# Patient Record
Sex: FEMALE | Race: WHITE
[De-identification: names, ages, dates, MRNs, and addresses within clinical notes are randomized per-mention and may not be internally consistent; named-entity substitution may affect disease eponyms.]

---

## 2017-11-14 ENCOUNTER — HOSPITAL ENCOUNTER (INPATIENT)
Dept: HOSPITAL 72 - EMR | Age: 42
LOS: 9 days | Discharge: HOME HEALTH | DRG: 574 | End: 2017-11-23
Payer: COMMERCIAL

## 2017-11-14 VITALS — SYSTOLIC BLOOD PRESSURE: 104 MMHG | DIASTOLIC BLOOD PRESSURE: 58 MMHG

## 2017-11-14 VITALS — DIASTOLIC BLOOD PRESSURE: 82 MMHG | SYSTOLIC BLOOD PRESSURE: 155 MMHG

## 2017-11-14 VITALS — HEIGHT: 69 IN | WEIGHT: 280 LBS | BODY MASS INDEX: 41.47 KG/M2

## 2017-11-14 VITALS — DIASTOLIC BLOOD PRESSURE: 77 MMHG | SYSTOLIC BLOOD PRESSURE: 146 MMHG

## 2017-11-14 DIAGNOSIS — L03.311: Primary | ICD-10-CM

## 2017-11-14 DIAGNOSIS — B96.5: ICD-10-CM

## 2017-11-14 DIAGNOSIS — D62: ICD-10-CM

## 2017-11-14 DIAGNOSIS — K21.9: ICD-10-CM

## 2017-11-14 DIAGNOSIS — R06.02: ICD-10-CM

## 2017-11-14 DIAGNOSIS — M79.7: ICD-10-CM

## 2017-11-14 DIAGNOSIS — T83.511A: ICD-10-CM

## 2017-11-14 DIAGNOSIS — I10: ICD-10-CM

## 2017-11-14 DIAGNOSIS — L73.2: ICD-10-CM

## 2017-11-14 DIAGNOSIS — L03.111: ICD-10-CM

## 2017-11-14 DIAGNOSIS — N39.0: ICD-10-CM

## 2017-11-14 DIAGNOSIS — I47.1: ICD-10-CM

## 2017-11-14 DIAGNOSIS — E66.01: ICD-10-CM

## 2017-11-14 LAB
ALBUMIN/GLOB SERPL: 0.9 {RATIO} (ref 1–2.7)
ALT SERPL-CCNC: 21 U/L (ref 12–78)
ANION GAP SERPL CALC-SCNC: 8 MMOL/L (ref 5–15)
APPEARANCE UR: CLEAR
AST SERPL-CCNC: 12 U/L (ref 15–37)
BACTERIA #/AREA URNS HPF: (no result) /HPF
BASOPHILS NFR BLD AUTO: 0.9 % (ref 0–2)
CALCIUM SERPL-MCNC: 8.8 MG/DL (ref 8.5–10.1)
CHLORIDE SERPL-SCNC: 106 MMOL/L (ref 98–107)
CK MB SERPL-MCNC: 1.7 NG/ML (ref 0–3.6)
CO2 SERPL-SCNC: 26 MMOL/L (ref 21–32)
CREAT SERPL-MCNC: 0.8 MG/DL (ref 0.55–1.3)
EOSINOPHIL NFR BLD AUTO: 2.6 % (ref 0–3)
ERYTHROCYTE [DISTWIDTH] IN BLOOD BY AUTOMATED COUNT: 12.6 % (ref 11.6–14.8)
GFR SERPLBLD BASED ON 1.73 SQ M-ARVRAT: > 60 ML/MIN (ref 60–?)
GLOBULIN SER-MCNC: 4.2 G/DL
KETONES UR QL STRIP: NEGATIVE
LEUKOCYTE ESTERASE UR QL STRIP: NEGATIVE
LYMPHOCYTES NFR BLD AUTO: 21.4 % (ref 20–45)
MCH RBC QN AUTO: 29 PG (ref 27–31)
MCHC RBC AUTO-ENTMCNC: 32.9 G/DL (ref 32–36)
MCV RBC AUTO: 88 FL (ref 80–99)
MONOCYTES NFR BLD AUTO: 5.8 % (ref 1–10)
NEUTROPHILS NFR BLD AUTO: 69.4 % (ref 45–75)
NITRITE UR QL STRIP: NEGATIVE
PH UR STRIP: 6 [PH] (ref 4.5–8)
PLATELET # BLD: 353 K/UL (ref 150–450)
PMV BLD AUTO: 6.1 FL (ref 6.5–10.1)
POTASSIUM SERPL-SCNC: 4 MMOL/L (ref 3.5–5.1)
PROT SERPL-MCNC: 8.1 G/DL (ref 6.4–8.2)
PROT UR QL STRIP: NEGATIVE
RBC # BLD AUTO: 4.53 M/UL (ref 4.2–5.4)
RBC #/AREA URNS HPF: (no result) /HPF (ref 0–2)
SODIUM SERPL-SCNC: 140 MMOL/L (ref 136–145)
SP GR UR STRIP: 1.01 (ref 1–1.03)
SQUAMOUS #/AREA URNS LPF: (no result) /LPF
UROBILINOGEN UR-MCNC: NORMAL MG/DL (ref 0–1)
WBC # BLD AUTO: 8.8 K/UL (ref 4.8–10.8)
WBC #/AREA URNS HPF: (no result) /HPF (ref 0–2)

## 2017-11-14 PROCEDURE — 82553 CREATINE MB FRACTION: CPT

## 2017-11-14 PROCEDURE — 93005 ELECTROCARDIOGRAM TRACING: CPT

## 2017-11-14 PROCEDURE — 80053 COMPREHEN METABOLIC PANEL: CPT

## 2017-11-14 PROCEDURE — 87070 CULTURE OTHR SPECIMN AEROBIC: CPT

## 2017-11-14 PROCEDURE — 94150 VITAL CAPACITY TEST: CPT

## 2017-11-14 PROCEDURE — 94760 N-INVAS EAR/PLS OXIMETRY 1: CPT

## 2017-11-14 PROCEDURE — 71010: CPT

## 2017-11-14 PROCEDURE — 87040 BLOOD CULTURE FOR BACTERIA: CPT

## 2017-11-14 PROCEDURE — 81025 URINE PREGNANCY TEST: CPT

## 2017-11-14 PROCEDURE — 87205 SMEAR GRAM STAIN: CPT

## 2017-11-14 PROCEDURE — 86901 BLOOD TYPING SEROLOGIC RH(D): CPT

## 2017-11-14 PROCEDURE — 99285 EMERGENCY DEPT VISIT HI MDM: CPT

## 2017-11-14 PROCEDURE — 83605 ASSAY OF LACTIC ACID: CPT

## 2017-11-14 PROCEDURE — 94003 VENT MGMT INPAT SUBQ DAY: CPT

## 2017-11-14 PROCEDURE — 93970 EXTREMITY STUDY: CPT

## 2017-11-14 PROCEDURE — 86900 BLOOD TYPING SEROLOGIC ABO: CPT

## 2017-11-14 PROCEDURE — 87181 SC STD AGAR DILUTION PER AGT: CPT

## 2017-11-14 PROCEDURE — 36415 COLL VENOUS BLD VENIPUNCTURE: CPT

## 2017-11-14 PROCEDURE — 85025 COMPLETE CBC W/AUTO DIFF WBC: CPT

## 2017-11-14 PROCEDURE — 81003 URINALYSIS AUTO W/O SCOPE: CPT

## 2017-11-14 PROCEDURE — 93306 TTE W/DOPPLER COMPLETE: CPT

## 2017-11-14 PROCEDURE — 80048 BASIC METABOLIC PNL TOTAL CA: CPT

## 2017-11-14 PROCEDURE — 87086 URINE CULTURE/COLONY COUNT: CPT

## 2017-11-14 PROCEDURE — 86850 RBC ANTIBODY SCREEN: CPT

## 2017-11-14 PROCEDURE — 80076 HEPATIC FUNCTION PANEL: CPT

## 2017-11-14 PROCEDURE — 80202 ASSAY OF VANCOMYCIN: CPT

## 2017-11-14 PROCEDURE — 85007 BL SMEAR W/DIFF WBC COUNT: CPT

## 2017-11-14 PROCEDURE — 83735 ASSAY OF MAGNESIUM: CPT

## 2017-11-14 RX ADMIN — DOCUSATE SODIUM SCH MG: 100 CAPSULE, LIQUID FILLED ORAL at 20:50

## 2017-11-14 RX ADMIN — METOPROLOL TARTRATE SCH MG: 50 TABLET, FILM COATED ORAL at 17:07

## 2017-11-14 RX ADMIN — SODIUM CHLORIDE SCH MLS/HR: 0.9 INJECTION INTRAVENOUS at 18:08

## 2017-11-14 NOTE — HISTORY AND PHYSICAL
History of Present Illness


General


Date patient seen:  Nov 14, 2017


Time patient seen:  14:34


Reason for Hospitalization:  Abscess





Present Illness


HPI


41y/o female with pmh of hidradenitis suppurative, paroxysmal atrial tachycardia

, HTN, GERD who presents with worsening b/l axillary lesions and lower 

abdominal lesions. Pt notes worsening pain/swelling/redness/drainage from b/l 

axillary and lower abd. She has had this for many years. She had prior surgery 

on R axilla abt 1 yr and 10mo ago w/o much success. She has tried antibiotics 

without much effect. She denies f/c, n/v, d/c, chest pain, SOB, abd pain, 

dysuria. She has had general anesthesia before without complications. Able to 

walk a few blocks or climb a flight of stairs w/o symptoms of chest pain, SOB. 

She follows up with a cardiology regarding her Atach and it controlled on 

metoprolol. She has undergone cardiac eval including ECHO which has been 

unremarkable.


Allergies:  


Coded Allergies:  


     CELECOXIB (Verified  Allergy, Unknown, 11/14/17)





Medication History


Scheduled


Cetirizine Hcl* (Zyrtec*), 10 MG ORAL DAILY, (Reported)


Metoprolol Tartrate (Metoprolol Tartrate), 25 MG ORAL DAILY, (Reported)


Metoprolol Tartrate* (Metoprolol Tartrate*), 50 MG ORAL QPM, (Reported)


Omeprazole Magnesium (Prilosec Otc), 20 MG ORAL unknown , (Reported)


Turmeric Root Extract (Turmeric), 500 MG PO TID, (Reported)





Miscellaneous Medications


Ascorbic Acid* (Vitamin C*), 500 MG ORAL, (Reported)


Guaifenesin/Pseudoephedrne Hcl (Mucinex D Er Tablet), 1 EACH PO, (Reported)


Multivitamin/Iron/Folic Acid (Centrum Complete Multivit Tab), 1 EACH PO, (

Reported)


Naproxen Sodium (Aleve), 220 MG PO, (Reported)





Patient History


Healthcare decision maker





Resuscitation status


Full Code


Advanced Directive on File


No





Past Medical/Surgical History


Past Medical/Surgical History:  


(1) Paroxysmal atrial tachycardia


(2) GERD (gastroesophageal reflux disease)


(3) HTN (hypertension)


(4) Hidradenitis suppurativa





Family History


Family History:  


FH: COPD (chronic obstructive pulmonary disease)


FH: HTN (hypertension)


FH: skin cancer





Social History


Social History:  


(1) No significant social history





Review of Systems


ROS Narrative


CONSTITUTIONAL: No weight loss, fever, chills, weakness or fatigue.


HEENT: Eyes: No visual loss, blurred vision, double vision or yellow sclerae. 

Ears, Nose, Throat: No hearing loss, sneezing, congestion, runny nose or sore 

throat.


SKIN: No rash or itching.


CARDIOVASCULAR: No chest pain, chest pressure or chest discomfort. No 

palpitations or edema.


RESPIRATORY: No shortness of breath, cough or sputum.


GASTROINTESTINAL: No anorexia, nausea, vomiting or diarrhea. No abdominal pain 

or blood.


NEUROLOGICAL: No headache, dizziness, syncope, paralysis, ataxia, numbness or 

tingling in the extremities. No change in bowel or bladder control.


MUSCULOSKELETAL: No muscle, back pain, joint pain or stiffness.


HEMATOLOGIC: No anemia, bleeding or bruising.


LYMPHATICS: No enlarged nodes. No history of splenectomy.


PSYCHIATRIC: No history of depression or anxiety.


ENDOCRINOLOGIC: No reports of sweating, cold or heat intolerance. No polyuria 

or polydipsia.


ALLERGIES: No history of asthma, hives, eczema or rhinitis.





Physical Exam


Physical Exam Narrative


General: alert, cooperative, no distress, appears stated age


Head: normocephalic, without obvious abnormality, atraumatic


Eyes: conjunctivae/corneas clear. PERRL, EOM's intact


Throat: lips, mucosa, and tongue normal. MMM


Neck: supple, symmetrical, trachea midline, and no JVD


Lungs: clear to auscultation bilaterally


Heart: regular rate and rhythm, S1, S2 normal, no murmur, click, rub or gallop


Abdomen: soft, non-tender, non-distended, bowel sounds normal; no masses or 

organomegaly


Extremities: extremities normal, atraumatic, no cyanosis or edema


Pulses: 2+ and symmetric


Skin: +HS lesions of b/l axilla and lower abd


Neurologic: grossly normal, no focal deficits





Last 24 Hour Vital Signs








  Date Time  Temp Pulse Resp B/P (MAP) Pulse Ox O2 Delivery O2 Flow Rate FiO2


 


11/14/17 13:43 98.1 78 20 146/77 97 Room Air  


 


11/14/17 11:20 98.1 78 20 146/77 97 Room Air  


 


11/14/17 11:16 98.1 75 20 146/77 97 Room Air  

















Intake and Output  


 


 11/14/17 11/15/17





 19:00 07:00


 


Intake Total 50 ml 


 


Balance 50 ml 


 


  


 


Intake Oral 50 ml 


 


# Voids 1 











Laboratory Tests








Test


  11/14/17


11:40 11/14/17


12:30


 


White Blood Count


  8.8 K/UL


(4.8-10.8) 


 


 


Red Blood Count


  4.53 M/UL


(4.20-5.40) 


 


 


Hemoglobin


  13.1 G/DL


(12.0-16.0) 


 


 


Hematocrit


  39.9 %


(37.0-47.0) 


 


 


Mean Corpuscular Volume 88 FL (80-99)   


 


Mean Corpuscular Hemoglobin


  29.0 PG


(27.0-31.0) 


 


 


Mean Corpuscular Hemoglobin


Concent 32.9 G/DL


(32.0-36.0) 


 


 


Red Cell Distribution Width


  12.6 %


(11.6-14.8) 


 


 


Platelet Count


  353 K/UL


(150-450) 


 


 


Mean Platelet Volume


  6.1 FL


(6.5-10.1)  L 


 


 


Neutrophils (%) (Auto)


  69.4 %


(45.0-75.0) 


 


 


Lymphocytes (%) (Auto)


  21.4 %


(20.0-45.0) 


 


 


Monocytes (%) (Auto)


  5.8 %


(1.0-10.0) 


 


 


Eosinophils (%) (Auto)


  2.6 %


(0.0-3.0) 


 


 


Basophils (%) (Auto)


  0.9 %


(0.0-2.0) 


 


 


Sodium Level


  140 MMOL/L


(136-145) 


 


 


Potassium Level


  4.0 MMOL/L


(3.5-5.1) 


 


 


Chloride Level


  106 MMOL/L


() 


 


 


Carbon Dioxide Level


  26 MMOL/L


(21-32) 


 


 


Anion Gap


  8 mmol/L


(5-15) 


 


 


Blood Urea Nitrogen


  13 mg/dL


(7-18) 


 


 


Creatinine


  0.8 MG/DL


(0.55-1.30) 


 


 


Estimat Glomerular Filtration


Rate > 60 mL/min


(>60) 


 


 


Glucose Level


  104 MG/DL


() 


 


 


Calcium Level


  8.8 MG/DL


(8.5-10.1) 


 


 


Total Bilirubin


  0.2 MG/DL


(0.2-1.0) 


 


 


Aspartate Amino Transf


(AST/SGOT) 12 U/L (15-37)


L 


 


 


Alanine Aminotransferase


(ALT/SGPT) 21 U/L (12-78)


  


 


 


Alkaline Phosphatase


  55 U/L


() 


 


 


Creatine Kinase MB


  1.7 NG/ML


(0.0-3.6) 


 


 


Total Protein


  8.1 G/DL


(6.4-8.2) 


 


 


Albumin


  3.9 G/DL


(3.4-5.0) 


 


 


Globulin 4.2 g/dL   


 


Albumin/Globulin Ratio


  0.9 (1.0-2.7)


L 


 


 


Urine Color  Pale yellow  


 


Urine Appearance  Clear  


 


Urine pH  6 (4.5-8.0)  


 


Urine Specific Gravity


  


  1.010


(1.005-1.035)


 


Urine Protein


  


  Negative


(NEGATIVE)


 


Urine Glucose (UA)


  


  Negative


(NEGATIVE)


 


Urine Ketones


  


  Negative


(NEGATIVE)


 


Urine Occult Blood


  


  4+ (NEGATIVE)


H


 


Urine Nitrite


  


  Negative


(NEGATIVE)


 


Urine Bilirubin


  


  Negative


(NEGATIVE)


 


Urine Urobilinogen


  


  Normal MG/DL


(0.0-1.0)


 


Urine Leukocyte Esterase


  


  Negative


(NEGATIVE)


 


Urine RBC


  


  5-10 /HPF (0 -


2)  H


 


Urine WBC


  


  0-2 /HPF (0 -


2)


 


Urine Squamous Epithelial


Cells 


  Occasional


/LPF


 


Urine Bacteria


  


  Occasional


/HPF (NONE)


 


Lactic Acid Level


  


  1.10 mmol/L


(0.66-2.22)








Height (Feet):  5


Height (Inches):  9.00


Weight (Pounds):  270


Medications





Current Medications








 Medications


  (Trade)  Dose


 Ordered  Sig/Hilda


 Route


 PRN Reason  Start Time


 Stop Time Status Last Admin


Dose Admin


 


 Acetaminophen


  (Tylenol)  650 mg  Q4H  PRN


 ORAL


 Mild Pain (Pain Scale 1-3)  11/14/17 13:30


 12/14/17 13:29   


 


 


 Al Hydroxide/Mg


 Hydroxide


  (Mylanta II)  30 ml  Q6H  PRN


 ORAL


 dyspepsia  11/14/17 13:30


 12/14/17 13:29   


 


 


 Dextrose


  (Dextrose 50%)    STAT  PRN


 IV


 Hypoglycemia  11/14/17 13:30


 12/14/17 13:29   


 


 


 Dextrose/Sodium


 Chloride  1,000 ml @ 


 75 mls/hr  [id number]


 IV


   11/15/17 06:00


 12/15/17 05:59   


 


 


 Diphenhydramine


 HCl


  (Benadryl)  25 mg  Q6H  PRN


 ORAL


 Itching/Pruritis  11/14/17 13:30


 12/14/17 13:29   


 


 


 Docusate Sodium


  (Colace)  100 mg  EVERY 12  HOURS


 ORAL


   11/14/17 21:00


 12/14/17 20:59   


 


 


 Metoprolol


 Tartrate


  (Lopressor)  25 mg  DAILY


 ORAL


   11/15/17 09:00


 12/15/17 08:59 UNV  


 


 


 Metoprolol


 Tartrate


  (Lopressor)  50 mg  QPM


 ORAL


   11/14/17 16:30


 12/14/17 16:29 UNV  


 


 


 Morphine Sulfate


  (Morphine


 Sulfate)  2 mg  Q4H  PRN


 IVP


 Moderate Pain (Pain Scale 4-6)  11/14/17 13:30


 11/21/17 13:29   


 


 


 Morphine Sulfate


  (Morphine


 Sulfate)  4 mg  Q4H  PRN


 IVP


 Severe Pain (Pain Scale 7-10)  11/14/17 13:30


 11/21/17 13:29   


 


 


 Pantoprazole


  (Protonix)  40 mg  DAILY


 ORAL


   11/15/17 09:00


 12/15/17 08:59 UNV  


 


 


 Polyethylene


 Glycol


  (Miralax)  17 gm  HSPRN  PRN


 ORAL


 Constipation  11/14/17 13:30


 12/14/17 13:29   


 


 


 Zolpidem Tartrate


  (Ambien)  5 mg  HSPRN  PRN


 ORAL


 Insomnia  11/14/17 13:30


 11/21/17 13:29   


 











Assessment/Plan


Problem List:  


(1) Hidradenitis suppurativa


ICD Codes:  L73.2 - Hidradenitis suppurativa


SNOMED:  99579681


(2) Abscess


ICD Codes:  L02.91 - Cutaneous abscess, unspecified


SNOMED:  998782234


(3) Paroxysmal atrial tachycardia


ICD Codes:  I47.1 - Supraventricular tachycardia


SNOMED:  749447669


(4) HTN (hypertension)


ICD Codes:  I10 - Essential (primary) hypertension


SNOMED:  94081647


(5) GERD (gastroesophageal reflux disease)


ICD Codes:  K21.9 - Gastro-esophageal reflux disease without esophagitis


SNOMED:  969241871


Status:  stable


Assessment/Plan


Admit inpt


Plastic surgery consulted


Check blood cultures


IV antibiotics


Wound care per surgery


Pain control, bowel regimen


Supportive care


Cont home MTP, PPI





If patient is required to have surgery, based on the patient's medical history, 

and other available ancillary data, the patient is a LOW risk for an 

INTERMEDIATE risk procedure. Per the most recent ACC/AHA guidelines, the 

patient does not need any further cardiopulmonary testing prior to the 

procedure and there do not appear to be any clear medical contraindications to 

proceeding with the proposed procedure.





D/w pt/family, RN, SW/CM, surgery regarding mgmt and dispo











Phil Marie M.D. Nov 14, 2017 14:35

## 2017-11-14 NOTE — EMERGENCY ROOM REPORT
History of Present Illness


General


Chief Complaint:  Skin Rash/Abscess


Source:  Patient





Present Illness


HPI


42-year-old female presents ED for evaluation.  Patient referred by Dr. Bustos.  Patient has extensive hidradenitis in her bilateral axilla and lower 

abdomen.  Has had this for many years.  Patient has had prior surgery without 

improvement.  Denies any fevers or chills.  Denies pain.  States he is noticing 

significant discharge.  Denies any other associated symptoms


Allergies:  


Coded Allergies:  


     CELECOXIB (Verified  Allergy, Unknown, 11/14/17)





Patient History


Past Medical History:  HTN


Past Surgical History:  none


Pertinent Family History:  none


Social History:  Denies: smoking, alcohol use, drug use


Pregnant Now:  No


Immunizations:  UTD


Reviewed Nursing Documentation:  PMH: Agreed, PSxH: Agreed





Nursing Documentation-PMH


Hx Cardiac Problems:  Yes


Hx Hypertension:  Yes


Hx Seizures:  No - FIBROMYAGIA





Review of Systems


All Other Systems:  negative except mentioned in HPI





Physical Exam





Vital Signs








  Date Time  Temp Pulse Resp B/P (MAP) Pulse Ox O2 Delivery O2 Flow Rate FiO2


 


11/14/17 11:16 98.1 75 20 146/77 97 Room Air  








Sp02 EP Interpretation:  reviewed, normal


General Appearance:  no apparent distress, alert, GCS 15, non-toxic


Head:  normocephalic, atraumatic


Eyes:  bilateral eye normal inspection, bilateral eye PERRL


ENT:  hearing grossly normal, normal pharynx, no angioedema, normal voice


Neck:  full range of motion, supple/symm/no masses


Respiratory:  chest non-tender, lungs clear, normal breath sounds, speaking 

full sentences


Cardiovascular #1:  regular rate, rhythm, no edema


Cardiovascular #2:  2+ carotid (R), 2+ carotid (L), 2+ radial (R), 2+ radial (L)

, 2+ dorsalis pedis (R), 2+ dorsalis pedis (L)


Gastrointestinal:  normal bowel sounds, non tender, soft, non-distended, no 

guarding, no rebound


Rectal:  deferred


Genitourinary:  normal inspection, no CVA tenderness


Musculoskeletal:  back normal, gait/station normal, normal range of motion, non-

tender


Neurologic:  alert, oriented x3, responsive, motor strength/tone normal, 

sensory intact, speech normal


Psychiatric:  judgement/insight normal, memory normal, mood/affect normal, no 

suicidal/homicidal ideation


Reflexes:  3+ bicep (R), 3+ bicep (L), 3+ tricep (R), 3+ tricep (L), 3+ knee (R)

, 3+ knee (L)


Skin:  other - hidradenitis in bilateral axilla, lower abdomen


Lymphatic:  no adenopathy





Medical Decision Making


Diagnostic Impression:  


 Primary Impression:  


 Hidradenitis axillaris


ER Course


Hospital Course 


41 yo F presents to ED c/o rash to bilateral axilla, lower abdomen





Differential diagnoses include: Cellulitis, abscess, rash. 





Clinical course


Patient placed on stretcher.  After initial history and physical I ordered labs

, blood Cx, EKG, CXR, wound cx





labs reviewed - no leukocytosis, Hb/Hct stable, electrolytes ok


EKG-normal sinus rhythm no acute changes interpreted by me


Chest x-ray unremarkable





antibiotics given.  Dr. Bustos consulted and aware.  Case discussed with Dr Purdy and he agreed to accept the patient to his service for further care 

and support 





Diagnosis - hidradenitis axillaris





Patient admitted to floor in serious condition





Labs








Test


  11/14/17


11:40 11/14/17


12:30


 


White Blood Count


  8.8 K/UL


(4.8-10.8) 


 


 


Red Blood Count


  4.53 M/UL


(4.20-5.40) 


 


 


Hemoglobin


  13.1 G/DL


(12.0-16.0) 


 


 


Hematocrit


  39.9 %


(37.0-47.0) 


 


 


Mean Corpuscular Volume 88 FL (80-99)  


 


Mean Corpuscular Hemoglobin


  29.0 PG


(27.0-31.0) 


 


 


Mean Corpuscular Hemoglobin


Concent 32.9 G/DL


(32.0-36.0) 


 


 


Red Cell Distribution Width


  12.6 %


(11.6-14.8) 


 


 


Platelet Count


  353 K/UL


(150-450) 


 


 


Mean Platelet Volume


  6.1 FL


(6.5-10.1) 


 


 


Neutrophils (%) (Auto)


  69.4 %


(45.0-75.0) 


 


 


Lymphocytes (%) (Auto)


  21.4 %


(20.0-45.0) 


 


 


Monocytes (%) (Auto)


  5.8 %


(1.0-10.0) 


 


 


Eosinophils (%) (Auto)


  2.6 %


(0.0-3.0) 


 


 


Basophils (%) (Auto)


  0.9 %


(0.0-2.0) 


 


 


Sodium Level


  140 MMOL/L


(136-145) 


 


 


Potassium Level


  4.0 MMOL/L


(3.5-5.1) 


 


 


Chloride Level


  106 MMOL/L


() 


 


 


Carbon Dioxide Level


  26 MMOL/L


(21-32) 


 


 


Anion Gap


  8 mmol/L


(5-15) 


 


 


Blood Urea Nitrogen


  13 mg/dL


(7-18) 


 


 


Creatinine


  0.8 MG/DL


(0.55-1.30) 


 


 


Estimat Glomerular Filtration


Rate > 60 mL/min


(>60) 


 


 


Glucose Level


  104 MG/DL


() 


 


 


Calcium Level


  8.8 MG/DL


(8.5-10.1) 


 


 


Total Bilirubin


  0.2 MG/DL


(0.2-1.0) 


 


 


Aspartate Amino Transf


(AST/SGOT) 12 U/L (15-37) 


  


 


 


Alanine Aminotransferase


(ALT/SGPT) 21 U/L (12-78) 


  


 


 


Alkaline Phosphatase


  55 U/L


() 


 


 


Creatine Kinase MB


  1.7 NG/ML


(0.0-3.6) 


 


 


Total Protein


  8.1 G/DL


(6.4-8.2) 


 


 


Albumin


  3.9 G/DL


(3.4-5.0) 


 


 


Globulin 4.2 g/dL  


 


Albumin/Globulin Ratio 0.9 (1.0-2.7)  


 


Urine Color  Pale yellow 


 


Urine Appearance  Clear 


 


Urine pH  6 (4.5-8.0) 


 


Urine Specific Gravity


  


  1.010


(1.005-1.035)


 


Urine Protein


  


  Negative


(NEGATIVE)


 


Urine Glucose (UA)


  


  Negative


(NEGATIVE)


 


Urine Ketones


  


  Negative


(NEGATIVE)


 


Urine Occult Blood  4+ (NEGATIVE) 


 


Urine Nitrite


  


  Negative


(NEGATIVE)


 


Urine Bilirubin


  


  Negative


(NEGATIVE)


 


Urine Urobilinogen


  


  Normal MG/DL


(0.0-1.0)


 


Urine Leukocyte Esterase


  


  Negative


(NEGATIVE)








EKG Diagnostic Results


Rate:  normal


Rhythm:  NSR


ST Segments:  no acute changes


ASA given to the pt in ED:  No





Rhythm Strip Diag. Results


EP Interpretation:  yes


Rhythm:  NSR, no PVC's, no ectopy





Chest X-Ray Diagnostic Results


Chest X-Ray Diagnostic Results :  


   Chest X-Ray Ordered:  Yes


   # of Views/Limited/Complete:  1 View


   Indication:  Other - preop


   EP Interpretation:  Yes


   Interpretation:  no consolidation, no effusion, no pneumothorax, no acute 

cardiopulmonary disease


   Impression:  No acute disease


   Electronically Signed by:  Electronically signed by Brendan Malik MD





Last Vital Signs








  Date Time  Temp Pulse Resp B/P (MAP) Pulse Ox O2 Delivery O2 Flow Rate FiO2


 


11/14/17 11:20 98.1 78 20 146/77 97 Room Air  








Status:  improved


Disposition:  ADMITTED AS INPATIENT


Condition:  Serious











BRENDAN MALIK M.D. Nov 14, 2017 12:24

## 2017-11-14 NOTE — DIAGNOSTIC IMAGING REPORT
Indication: Cough



Technique: One view of the chest



Comparison: none



Findings: Lungs and pleural spaces are clear. Heart size is normal.



Impression: No acute process

## 2017-11-15 VITALS — SYSTOLIC BLOOD PRESSURE: 146 MMHG | DIASTOLIC BLOOD PRESSURE: 66 MMHG

## 2017-11-15 VITALS — SYSTOLIC BLOOD PRESSURE: 138 MMHG | DIASTOLIC BLOOD PRESSURE: 73 MMHG

## 2017-11-15 VITALS — SYSTOLIC BLOOD PRESSURE: 119 MMHG | DIASTOLIC BLOOD PRESSURE: 65 MMHG

## 2017-11-15 VITALS — DIASTOLIC BLOOD PRESSURE: 53 MMHG | SYSTOLIC BLOOD PRESSURE: 137 MMHG

## 2017-11-15 VITALS — DIASTOLIC BLOOD PRESSURE: 83 MMHG | SYSTOLIC BLOOD PRESSURE: 137 MMHG

## 2017-11-15 VITALS — DIASTOLIC BLOOD PRESSURE: 55 MMHG | SYSTOLIC BLOOD PRESSURE: 129 MMHG

## 2017-11-15 VITALS — SYSTOLIC BLOOD PRESSURE: 144 MMHG | DIASTOLIC BLOOD PRESSURE: 60 MMHG

## 2017-11-15 VITALS — DIASTOLIC BLOOD PRESSURE: 60 MMHG | SYSTOLIC BLOOD PRESSURE: 159 MMHG

## 2017-11-15 VITALS — SYSTOLIC BLOOD PRESSURE: 145 MMHG | DIASTOLIC BLOOD PRESSURE: 58 MMHG

## 2017-11-15 VITALS — DIASTOLIC BLOOD PRESSURE: 56 MMHG | SYSTOLIC BLOOD PRESSURE: 134 MMHG

## 2017-11-15 VITALS — SYSTOLIC BLOOD PRESSURE: 137 MMHG | DIASTOLIC BLOOD PRESSURE: 53 MMHG

## 2017-11-15 PROCEDURE — 0JB60ZZ EXCISION OF CHEST SUBCUTANEOUS TISSUE AND FASCIA, OPEN APPROACH: ICD-10-PCS

## 2017-11-15 PROCEDURE — 0J880ZZ DIVISION OF ABDOMEN SUBCUTANEOUS TISSUE AND FASCIA, OPEN APPROACH: ICD-10-PCS

## 2017-11-15 PROCEDURE — 0JX60ZC TRANSFER CHEST SUBCUTANEOUS TISSUE AND FASCIA WITH SKIN, SUBCUTANEOUS TISSUE AND FASCIA, OPEN APPROACH: ICD-10-PCS

## 2017-11-15 PROCEDURE — 0JB80ZZ EXCISION OF ABDOMEN SUBCUTANEOUS TISSUE AND FASCIA, OPEN APPROACH: ICD-10-PCS

## 2017-11-15 PROCEDURE — 2W13X6Z COMPRESSION OF ABDOMINAL WALL USING PRESSURE DRESSING: ICD-10-PCS

## 2017-11-15 RX ADMIN — SODIUM CHLORIDE SCH MLS/HR: 0.9 INJECTION INTRAVENOUS at 02:28

## 2017-11-15 RX ADMIN — Medication PRN MLS/HR: at 15:16

## 2017-11-15 RX ADMIN — DOCUSATE SODIUM SCH MG: 100 CAPSULE, LIQUID FILLED ORAL at 21:21

## 2017-11-15 RX ADMIN — HEPARIN SODIUM SCH UNITS: 5000 INJECTION INTRAVENOUS; SUBCUTANEOUS at 21:23

## 2017-11-15 RX ADMIN — METOPROLOL TARTRATE SCH MG: 25 TABLET, FILM COATED ORAL at 08:00

## 2017-11-15 RX ADMIN — METOPROLOL TARTRATE SCH MG: 50 TABLET, FILM COATED ORAL at 16:30

## 2017-11-15 RX ADMIN — DEXTROSE AND SODIUM CHLORIDE SCH MLS/HR: 5; .45 INJECTION, SOLUTION INTRAVENOUS at 21:24

## 2017-11-15 RX ADMIN — DEXTROSE AND SODIUM CHLORIDE SCH MLS/HR: 5; .45 INJECTION, SOLUTION INTRAVENOUS at 05:39

## 2017-11-15 RX ADMIN — DEXTROSE AND SODIUM CHLORIDE SCH MLS/HR: 5; .45 INJECTION, SOLUTION INTRAVENOUS at 05:40

## 2017-11-15 RX ADMIN — DOCUSATE SODIUM SCH MG: 100 CAPSULE, LIQUID FILLED ORAL at 08:58

## 2017-11-15 RX ADMIN — HEPARIN SODIUM SCH UNITS: 5000 INJECTION INTRAVENOUS; SUBCUTANEOUS at 10:00

## 2017-11-15 RX ADMIN — SODIUM CHLORIDE SCH MLS/HR: 0.9 INJECTION INTRAVENOUS at 16:57

## 2017-11-15 RX ADMIN — SODIUM CHLORIDE SCH MLS/HR: 0.9 INJECTION INTRAVENOUS at 10:00

## 2017-11-15 NOTE — PRE-PROCEDURE NOTE/ATTESTATION
Pre-Procedure Note/Attestation


Complete Prior to Procedure


Planned Procedure:  right


Procedure Narrative:


Right axillary and lower abdominal wall debridement with flap elevation





Attestation


I attest that I discussed the nature of the procedure; its benefits; risks and 

complications; and alternatives (and the risks and benefits of such alternatives

), prior to the procedure, with the patient (or the patient's legal 

representative).





I attest that, if there was a reasonable possibility of needing a blood 

transfusion, the patient (or the patient's legal representative) was given the 

[facility] standardized written summary, pursuant 

to the [employer] Blood Safety Act ([place] Health and Safety Code # 1645, as 

amended).





I attest that I re-evaluated the patient just prior to the surgery and that 

there has been no change in the patient's H&P, except as documented below:











LIZ VALLES Nov 15, 2017 08:47

## 2017-11-15 NOTE — CARDIOLOGY REPORT
--------------- APPROVED REPORT --------------





EKG Measurement

Heart [id number]

DE [id number]

[id number]

[id number]

[id number]





Normal sinus rhythm

Normal ECG

## 2017-11-15 NOTE — IMMEDIATE POST-OP EVALUATION
Immediate Post-Op Evalulation


Immediate Post-Op Evalulation


Procedure:  Excision of R axillary and anterior wall HS


Date of Evaluation:  Nov 15, 2017


Time of Evaluation:  14:59


IV Fluids:  1000


Blood Products:  none


Estimated Blood Loss:  100


Urinary Output:  100


Blood Pressure Systolic:  144


Blood Pressure Diastolic:  62


Pulse Rate:  58


Respiratory Rate:  20


O2 Sat by Pulse Oximetry:  99


Temperature (Fahrenheit):  98.2


Pain Score (1-10):  2


Nausea:  No


Vomiting:  No


Complications


none


Patient Status:  reacts, patent, extubated, none


Hydration Status:  adequate











RODGER MAGANA M.D. Nov 15, 2017 15:01

## 2017-11-15 NOTE — OPERATIVE NOTE - DICTATED
DATE OF OPERATION:  11/15/2017



PREOPERATIVE DIAGNOSIS:  Right axillary and lower abdominal wall

infection.



POSTOPERATIVE DIAGNOSIS:  Right axillary and lower abdominal wall

infection.



PROCEDURES:

1. Radical excision of right axillary tissue.

2. Elevation of the thoracodorsal artery flap for staged closure of

right axillary wound.

3. Elevation of an anterior chest wall flap for delayed closure of right

axillary wound.

4. Debridement of lower abdominal wall tissue.

5. Elevation of a superior abdominal wall flap for staged closure of

abdominal wound.

6. Placement of an abdominal wound VAC.



SURGEON:  Kristie Bustos M.D.



ASSISTANT:  Margaret Arboleda M.D.



ANESTHESIA:  General.



COMPLICATIONS:  None.



EBL:  50 mL.



DISPOSITION:  Stable to the recovery room.



INDICATIONS FOR SURGERY:  This is a 42-year-old female who presented with a

right axillary as well as lower abdominal infected tissue consistent with

advanced hidradenitis.  She had failed medical management and presented to

the emergency room with drainage and pain from these areas.  Upon

evaluation by me, I felt that she was an appropriate candidate for a

staged approach with radical excision of both areas with flap elevation

and delayed closure of her wounds.  She understands the risks and benefits

of surgery and agrees to proceed.



DETAILS OF THE OPERATION:  The patient was brought to the operating room

and laid in the supine position on the operating table.  Her right axilla

and chest as well as her abdomen were prepped and draped in a sterile and

usual fashion.  We first began by delineating the area of disease in the

right axilla and then a corresponding thoracodorsal artery flap on the

lateral chest wall was designed.  A total of 10 mL of lidocaine with

epinephrine was injected into the wound bed and a #10 blade was then used

to make the axillary incision.  The radical excision was then completed by

dissecting with electrocautery all the way down to the level of the

axillary fascia preserving all the vital structures.  Once the radical

excision was completed, the wound that resulted was 10 x 10 cm and was

clearly not amenable to primary closure.  We proceeded to elevate the

thoracodorsal artery flap by making a U-shaped incision along the flap

design.  The incision was carried down to the platysmas muscle fashion and

the flap was fully elevated based off of perforators of the thoracodorsal

artery and it was noted that it cover most of the wound as it was

transposed into the defect.  However, we noted that additional closure was

necessary as such in the anterior chest wall flap based off of perforators

of the thoracoacromial artery was elevated off of the pectoralis muscle

fascia with proximal and distal incisions made to fully mobilize the flap.

With these flaps elevated we were able to close the defect temporarily

without any tension.  The wound was then opened and hemostasis was

achieved.  After pulse lavage irrigation and given the fact that there was

infection present.  I felt that it was not appropriate to perform

definitive flap inset at this time.  As such, the decision was made to put

the flaps back in situ and bring the patient back within 48 hours to

perform definitive flap inset.  The wounds were covered with dressings and

then we turned our attention to the lower abdominal region.  There was a

large area of infection present in the lower abdominal infraumbilical

area.  An elliptical type of incision was designed around this area of

infection.  A #10 blade was then used to make the incision all the way

down across the lower abdomen as well as the superior aspect of the

incision designed and then electrocautery was used to radically excise the

specimen all the way down to the level of the rectus fascia.  With the

specimen removed on block, the defect that resulted was 45 x 20 cm and was

clearly not amenable to primary closure.  As such, the superior abdominal

wall flaps had to be elevated based off of perforators of the bilateral

superior epigastric artery to allow for a tension-free repair.  Again,

however, given the amount of pus that we encountered at the time of the

radical excision, I felt that it was appropriate to delay the closure by

partially closing the wound and placing an interval wound VAC and bring

the patient back to the operating room for definitive wound closure in 48

hours.  This was done following hemostasis and irrigation and the patient

tolerated the procedure well.  There was no complications.  EBL was 50 mL

and the patient was brought back to the operating room in 48 hours for

definitive flap inset and closure of both the abdomen and right axillary

wounds.









  ______________________________________________

  Kristie Bustos M.D.





DR:  [email or web address]

D:  11/15/2017 14:46

T:  11/15/2017 21:16

JOB#:  [id number]

CC:

## 2017-11-15 NOTE — CONSULTATION
DATE OF CONSULTATION:  11/15/2017



ADMITTING PHYSICIAN:  David Montano M.D.



HISTORY OF PRESENT ILLNESS:  This is a 42-year-old female admitted with

right axillary and lower abdominal abscesses.  She was started on IV

antibiotics and I am seeing the patient for evaluation for surgical

debridement and reconstruction.



PAST MEDICAL HISTORY:  Significant for morbid obesity and hidradenitis.

Also significant for an arrhythmia.



PAST SURGICAL HISTORY:  Significant for previous attempts of axillary

hidradenitis excision.



PHYSICAL EXAMINATION:

GENERAL:  The patient is alert and oriented.

CHEST:  Clear to auscultation bilaterally.

ABDOMEN:  Reveals areas of multiple abscesses in the lower abdominal region

and also in the axilla on the right side, she has multiple abscesses as

well.



ASSESSMENT AND PLAN:  This is a 42-year-old female with advanced

hidradenitis of both the axillae and lower abdomen.  She will require a

staged treatment with radical excision of both tissues with staged

reconstruction.  Again, the reason for the staging is to reduce the chance

of postoperative wound infection given the amount of pus present at this

point in time.  The patient understands our plan and agrees to proceed.









  ______________________________________________

  Kristie Bustos M.D. DR:  Giacomo

D:  11/15/2017 08:50

T:  11/15/2017 15:06

JOB#:  [id number]

CC:

## 2017-11-15 NOTE — ANETHESIA PREOPERATIVE EVAL
Anesthesia Pre-op PMH/ROS


General


Date of Evaluation:  Nov 15, 2017


Time of Evaluation:  12:30


Anesthesiologist:  Manuel


ASA Score:  ASA 3


Mallampati Score


Class I : Soft palate, uvula, fauces, pillars visible


Class II: Soft palate, uvula, fauces visible


Class III: Soft palate, base of uvula visible


Class IV: Only hard plate visible


Mallampati Classification:  Class III


Surgeon:  Juli


Diagnosis:  Recurrent HS


Surgical Procedure:  Excision of R axillary and ant. abdominal wall HS


Anesthesia History:  PONV


Family History:  no anesthesia problems


Allergies:  


Coded Allergies:  


     CELECOXIB (Verified  Allergy, Unknown, 11/14/17)


Medications:  see eMAR





Past Medical History


Cardiovascular:  Reports: arrhythmia - Paroxysmal tachicardia, 


   Denies: HTN, CAD, MI, valve dz, other


Pulmonary:  Reports: POLY, 


   Denies: asthma, COPD, other


Gastrointestinal/Genitourinary:  Reports: GERD, 


   Denies: CRI, ESRD, other


Neurologic/Psychiatric:  Reports: depression/anxiety, 


   Denies: dementia, CVA, TIA, other


Endocrine:  Denies: DM, hypothyroidism, steroids, other


HEENT:  Denies: cataract (L), cataract (R), glaucoma, [place] (L), [place] (R), other


Hematology/Immune:  Denies: anemia, DVT, bleeding disorder, other


Musculoskeletal/Integumentary:  Denies: OA, RA, DJD, DDD, edema, other


Other:  obesity - morbid obesity


PMH Narrative:


as above


PSxH Narrative:


Cholecystectomy





Anesthesia Pre-op Phys. Exam


Physician Exam





Last Vital Signs








  Date Time  Temp Pulse Resp B/P (MAP) Pulse Ox O2 Delivery O2 Flow Rate FiO2


 


11/15/17 08:00 98.8 76 19 137/83 96 Room Air  








Constitutional:  NAD


Neurologic:  CN 2-12 intact


Cardiovascular:  RRR, no M/R/G


Respiratory:  other - obesity


Gastrointestinal:  S/NT/ND





Airway Exam


Mallampati Score:  Class III


MO:  full


Neck:  Short


ROM:  full


Teeth:  intact


Dentures:  no upper, no lower





Anesthesia Pre-op A/P


Labs


see chart


Urine Pregnancy Test











Test


  11/14/17


21:30


 


Urine HCG, Qualitative Negative  











Studies


Pre-op Studies:  echo - EF 55-60%





Risk Assessment & Plan


Assessment:


ASA 3


Plan:


GA with ETT PONV prevention Scopolamine path on.


Status Change Before Surgery:  No





Pre-Antibiotics


Drug:  Ancef 2 gr.


Given Within 1 Hr of Incision:  Yes


Time Given:  13:16











RODGER MAGANA M.D. Nov 15, 2017 13:46

## 2017-11-15 NOTE — CARDIOLOGY REPORT
--------------- APPROVED REPORT --------------





EXAM: Two-dimensional and M-mode echocardiogram with Doppler and color 

Doppler.



INDICATION

Arrhythmia 



M-Mode DIMENSIONS 

IVSd1.2 (0.7-1.1cm)Left Atrium (MM)3.7 (1.6-4.0cm)

LVDd5.2 (3.5-5.6cm)Aortic Root3.0 (2.0-3.7cm)

PWd1.4 (0.7-1.1cm)Aortic Cusp Exc.2.0 (1.5-2.0cm)



LVDs2.9 (2.5-4.0cm)

PWs2.0 cm





Normal left ventricular chamber size, systolic function and wall motion.

Left ventricular ejection fraction estimated to be  60-65 %.

Mild left ventricular hypertrophy by 2-D.

No evidence of pericardial effusion. 

Mild left atrial enlargement.

Right cardiac chamber sizes are within normal limits.

Normal appearing aortic, mitral, pulmonic and tricuspid valves.

Mild mitral annulus and aortic root calcification.

IVC at normal size with physiologic collapse.



A  color flow and spectral Doppler study was performed and revealed:

Trace mitral regurgitation.

Mitral inflow indicates normal left ventricular diastolic function. 

Trace tricuspid regurgitation.

Tricuspid  systolic velocities suggests peak right ventricular systolic pressure of  19  

mmHg. no anorexia/no chills/no fever

## 2017-11-15 NOTE — GENERAL PROGRESS NOTE
Assessment/Plan


Problem List:  


(1) Abscess


ICD Codes:  L02.91 - Cutaneous abscess, unspecified


SNOMED:  499384948


(2) Hidradenitis suppurativa


ICD Codes:  L73.2 - Hidradenitis suppurativa


SNOMED:  27430826


(3) Paroxysmal atrial tachycardia


ICD Codes:  I47.1 - Supraventricular tachycardia


SNOMED:  832231084


(4) HTN (hypertension)


ICD Codes:  I10 - Essential (primary) hypertension


SNOMED:  60654765


(5) GERD (gastroesophageal reflux disease)


ICD Codes:  K21.9 - Gastro-esophageal reflux disease without esophagitis


SNOMED:  233169467


Status:  stable


Assessment/Plan


Appreciate plastic surgery rec's


s/p Right axillary and lower abdominal wall debridement with flap elevation on 

11/15/17


Plan for OR tomorrow for wound closures


Cont iV abx


F/u cultures


Post operative recommendations include:


- encourage mobilization/ambulation


- encourage incentive spirometry to optimize pulmonary hygiene


- DVT/GI prophylaxis as appropriate--SCDs, HSQ


- pain control, supportive care, bowel regimen


Appreciate cardiology rec's


TTE unremarkable


Cont home MTP


Cont home PPI





DVT ppx: SCDs, HSQ as pt is now moderate risk postop





D/w pt/family, RN, SW/CM, surgery, cardiology regarding mgmt and dispo





Subjective


Date patient seen:  Nov 15, 2017


Time patient seen:  16:00


ROS Limited/Unobtainable:  No


Constitutional:  Reports: no symptoms


HEENT:  Reports: no symptoms


Cardiovascular:  Reports: no symptoms


Respiratory:  Reports: no symptoms


Gastrointestinal/Abdominal:  Reports: no symptoms


Genitourinary:  Reports: no symptoms


Neurologic/Psychiatric:  Reports: no symptoms


Endocrine:  Reports: no symptoms


Hematologic/Lymphatic:  Reports: no symptoms


Allergies:  


Coded Allergies:  


     CELECOXIB (Verified  Allergy, Unknown, 11/14/17)


All Systems:  reviewed and negative except above


Subjective


No acute o/n events


Right axillary and lower abdominal wall debridement with flap elevation today





Pt w/ some mild SOB, cough, congestion this AM. She states she is requiring for 

URI symptoms over the past 1-2 weeks. Currently feeling better. Pain controlled

, denies f/c, n/v, d/c, chest pain, palpitaitons





Objective





Last 24 Hour Vital Signs








  Date Time  Temp Pulse Resp B/P (MAP) Pulse Ox O2 Delivery O2 Flow Rate FiO2


 


11/15/17 20:00   18     


 


11/15/17 20:00 98.1 70 20 134/56 97 Room Air  


 


11/15/17 17:42   18     


 


11/15/17 16:30  64  119/52    


 


11/15/17 16:12   18     


 


11/15/17 16:00 98.0 56 18 129/55 100 Nasal Cannula 3.0 


 


11/15/17 15:57   18     


 


11/15/17 15:55 97.6       


 


11/15/17 15:55 97.6       


 


11/15/17 15:49  57 20 137/53 100 Simple Mask 8.0 


 


11/15/17 15:42   20     


 


11/15/17 15:36  57 20 137/53 100 Simple Mask 8.0 


 


11/15/17 15:30   20     


 


11/15/17 15:20  58 20 145/58 100 Simple Mask 8.0 


 


11/15/17 15:16   20     


 


11/15/17 15:05  62 20 159/60 100 Simple Mask 8.0 


 


11/15/17 15:01  58 20  99   


 


11/15/17 14:57  63 20 144/60 100 Simple Mask 8.0 


 


11/15/17 14:52 98.0 61 20 146/66 100 Simple Mask 8.0 


 


11/15/17 08:00 98.8 76 19 137/83 96 Room Air  


 


11/15/17 08:00  76  137/83    


 


11/15/17 04:00 98.4 70 18 119/65 96 Room Air  


 


11/15/17 00:15 97.4 64 18 138/73 99 Room Air  

















Intake and Output  


 


 11/15/17 11/16/17





 19:00 07:00


 


Intake Total 1200 ml 240 ml


 


Output Total 550 ml 


 


Balance 650 ml 240 ml


 


  


 


Intake Oral  240 ml


 


IV Total 1200 ml 


 


Output Urine Total 450 ml 


 


Estimated Blood Loss 100 ml 


 


# Voids 1 








Height (Feet):  5


Height (Inches):  9.00


Weight (Pounds):  280


Objective


General: alert, cooperative, no distress, appears stated age


Head: normocephalic, without obvious abnormality, atraumatic


Eyes: conjunctivae/corneas clear. PERRL, EOM's intact


Throat: lips, mucosa, and tongue normal. MMM


Neck: supple, symmetrical, trachea midline, and no JVD


Lungs: clear to auscultation bilaterally


Heart: regular rate and rhythm, S1, S2 normal, no murmur, click, rub or gallop


Abdomen: soft, non-tender, non-distended, bowel sounds normal; no masses or 

organomegaly


Extremities: extremities normal, atraumatic, no cyanosis or edema


Pulses: 2+ and symmetric


Skin: Dressing c/d/i


Neurologic: grossly normal, no focal deficits











Phil Marie M.D. Nov 15, 2017 22:53

## 2017-11-15 NOTE — CARDIAC ELECTROPHYSIOLOGY PN
Subjective


Subjective


Patient seen in preop area. Cardiology consult dictated. Stat echo at bedside 

reviewed Ef 65%. ECG completely normal. No CAD or CHF hx. DW Dr. Ferguson and 

anesthesiologist. Cleared for scheduled surgery. [id number]





Objective





Last 24 Hour Vital Signs








  Date Time  Temp Pulse Resp B/P (MAP) Pulse Ox O2 Delivery O2 Flow Rate FiO2


 


11/15/17 08:00 98.8 76 19 137/83 96 Room Air  


 


11/15/17 08:00  76  137/83    


 


11/15/17 04:00 98.4 70 18 119/65 96 Room Air  


 


11/15/17 00:15 97.4 64 18 138/73 99 Room Air  


 


11/14/17 20:27 98.5 83 19 104/58 93 Room Air  


 


11/14/17 17:07  67  155/82    


 


11/14/17 15:27 98.3 67 20 155/82 100 Room Air  


 


11/14/17 13:43 98.1 78 20 146/77 97 Room Air  


 


11/14/17 11:20 98.1 78 20 146/77 97 Room Air  


 


11/14/17 11:16 98.1 75 20 146/77 97 Room Air  











Laboratory Tests








Test


  11/14/17


11:40 11/14/17


12:30 11/14/17


21:30


 


White Blood Count


  8.8 K/UL


(4.8-10.8) 


  


 


 


Red Blood Count


  4.53 M/UL


(4.20-5.40) 


  


 


 


Hemoglobin


  13.1 G/DL


(12.0-16.0) 


  


 


 


Hematocrit


  39.9 %


(37.0-47.0) 


  


 


 


Mean Corpuscular Volume 88 FL (80-99)    


 


Mean Corpuscular Hemoglobin


  29.0 PG


(27.0-31.0) 


  


 


 


Mean Corpuscular Hemoglobin


Concent 32.9 G/DL


(32.0-36.0) 


  


 


 


Red Cell Distribution Width


  12.6 %


(11.6-14.8) 


  


 


 


Platelet Count


  353 K/UL


(150-450) 


  


 


 


Mean Platelet Volume


  6.1 FL


(6.5-10.1)  L 


  


 


 


Neutrophils (%) (Auto)


  69.4 %


(45.0-75.0) 


  


 


 


Lymphocytes (%) (Auto)


  21.4 %


(20.0-45.0) 


  


 


 


Monocytes (%) (Auto)


  5.8 %


(1.0-10.0) 


  


 


 


Eosinophils (%) (Auto)


  2.6 %


(0.0-3.0) 


  


 


 


Basophils (%) (Auto)


  0.9 %


(0.0-2.0) 


  


 


 


Sodium Level


  140 MMOL/L


(136-145) 


  


 


 


Potassium Level


  4.0 MMOL/L


(3.5-5.1) 


  


 


 


Chloride Level


  106 MMOL/L


() 


  


 


 


Carbon Dioxide Level


  26 MMOL/L


(21-32) 


  


 


 


Anion Gap


  8 mmol/L


(5-15) 


  


 


 


Blood Urea Nitrogen


  13 mg/dL


(7-18) 


  


 


 


Creatinine


  0.8 MG/DL


(0.55-1.30) 


  


 


 


Estimat Glomerular Filtration


Rate > 60 mL/min


(>60) 


  


 


 


Glucose Level


  104 MG/DL


() 


  


 


 


Calcium Level


  8.8 MG/DL


(8.5-10.1) 


  


 


 


Total Bilirubin


  0.2 MG/DL


(0.2-1.0) 


  


 


 


Aspartate Amino Transf


(AST/SGOT) 12 U/L (15-37)


L 


  


 


 


Alanine Aminotransferase


(ALT/SGPT) 21 U/L (12-78)


  


  


 


 


Alkaline Phosphatase


  55 U/L


() 


  


 


 


Creatine Kinase MB


  1.7 NG/ML


(0.0-3.6) 


  


 


 


Total Protein


  8.1 G/DL


(6.4-8.2) 


  


 


 


Albumin


  3.9 G/DL


(3.4-5.0) 


  


 


 


Globulin 4.2 g/dL    


 


Albumin/Globulin Ratio


  0.9 (1.0-2.7)


L 


  


 


 


Urine Color  Pale yellow   


 


Urine Appearance  Clear   


 


Urine pH  6 (4.5-8.0)   


 


Urine Specific Gravity


  


  1.010


(1.005-1.035) 


 


 


Urine Protein


  


  Negative


(NEGATIVE) 


 


 


Urine Glucose (UA)


  


  Negative


(NEGATIVE) 


 


 


Urine Ketones


  


  Negative


(NEGATIVE) 


 


 


Urine Occult Blood


  


  4+ (NEGATIVE)


H 


 


 


Urine Nitrite


  


  Negative


(NEGATIVE) 


 


 


Urine Bilirubin


  


  Negative


(NEGATIVE) 


 


 


Urine Urobilinogen


  


  Normal MG/DL


(0.0-1.0) 


 


 


Urine Leukocyte Esterase


  


  Negative


(NEGATIVE) 


 


 


Urine RBC


  


  5-10 /HPF (0 -


2)  H 


 


 


Urine WBC


  


  0-2 /HPF (0 -


2) 


 


 


Urine Squamous Epithelial


Cells 


  Occasional


/LPF 


 


 


Urine Bacteria


  


  Occasional


/HPF (NONE) 


 


 


Lactic Acid Level


  


  1.10 mmol/L


(0.66-2.22) 


 


 


Urine HCG, Qualitative   Negative  











Microbiology








 Date/Time


Source Procedure


Growth Status


 


 


 11/14/17 11:49


Wound Gram Stain


Pending Resulted


 


 11/14/17 11:49


Wound Wound Culture - Preliminary Resulted

















BAKARI RETANA Nov 15, 2017 09:40

## 2017-11-16 VITALS — SYSTOLIC BLOOD PRESSURE: 157 MMHG | DIASTOLIC BLOOD PRESSURE: 76 MMHG

## 2017-11-16 VITALS — DIASTOLIC BLOOD PRESSURE: 67 MMHG | SYSTOLIC BLOOD PRESSURE: 124 MMHG

## 2017-11-16 VITALS — SYSTOLIC BLOOD PRESSURE: 151 MMHG | DIASTOLIC BLOOD PRESSURE: 76 MMHG

## 2017-11-16 VITALS — DIASTOLIC BLOOD PRESSURE: 55 MMHG | SYSTOLIC BLOOD PRESSURE: 119 MMHG

## 2017-11-16 VITALS — DIASTOLIC BLOOD PRESSURE: 59 MMHG | SYSTOLIC BLOOD PRESSURE: 107 MMHG

## 2017-11-16 VITALS — DIASTOLIC BLOOD PRESSURE: 61 MMHG | SYSTOLIC BLOOD PRESSURE: 130 MMHG

## 2017-11-16 RX ADMIN — HEPARIN SODIUM SCH UNITS: 5000 INJECTION INTRAVENOUS; SUBCUTANEOUS at 08:59

## 2017-11-16 RX ADMIN — HEPARIN SODIUM SCH UNITS: 5000 INJECTION INTRAVENOUS; SUBCUTANEOUS at 20:51

## 2017-11-16 RX ADMIN — SODIUM CHLORIDE SCH MLS/HR: 0.9 INJECTION INTRAVENOUS at 00:49

## 2017-11-16 RX ADMIN — DEXTROSE AND SODIUM CHLORIDE SCH MLS/HR: 5; .45 INJECTION, SOLUTION INTRAVENOUS at 09:00

## 2017-11-16 RX ADMIN — DIPHENHYDRAMINE HYDROCHLORIDE PRN MG: 50 INJECTION INTRAMUSCULAR; INTRAVENOUS at 20:42

## 2017-11-16 RX ADMIN — SODIUM CHLORIDE SCH MLS/HR: 0.9 INJECTION INTRAVENOUS at 08:57

## 2017-11-16 RX ADMIN — METOPROLOL TARTRATE SCH MG: 25 TABLET, FILM COATED ORAL at 08:57

## 2017-11-16 RX ADMIN — GUAIFENESIN SCH MG: 600 TABLET, EXTENDED RELEASE ORAL at 19:36

## 2017-11-16 RX ADMIN — DOCUSATE SODIUM SCH MG: 100 CAPSULE, LIQUID FILLED ORAL at 08:56

## 2017-11-16 RX ADMIN — SODIUM CHLORIDE SCH MLS/HR: 0.9 INJECTION INTRAVENOUS at 18:28

## 2017-11-16 RX ADMIN — Medication PRN MLS/HR: at 16:50

## 2017-11-16 RX ADMIN — DEXTROSE AND SODIUM CHLORIDE SCH MLS/HR: 5; .45 INJECTION, SOLUTION INTRAVENOUS at 20:40

## 2017-11-16 RX ADMIN — METOPROLOL TARTRATE SCH MG: 50 TABLET, FILM COATED ORAL at 20:41

## 2017-11-16 RX ADMIN — DOCUSATE SODIUM SCH MG: 100 CAPSULE, LIQUID FILLED ORAL at 20:39

## 2017-11-16 NOTE — GENERAL PROGRESS NOTE
Assessment/Plan


Problem List:  


(1) Abscess


ICD Codes:  L02.91 - Cutaneous abscess, unspecified


SNOMED:  060329446


(2) Hidradenitis suppurativa


ICD Codes:  L73.2 - Hidradenitis suppurativa


SNOMED:  05299474


(3) Paroxysmal atrial tachycardia


ICD Codes:  I47.1 - Supraventricular tachycardia


SNOMED:  912587911


(4) HTN (hypertension)


ICD Codes:  I10 - Essential (primary) hypertension


SNOMED:  49955993


(5) GERD (gastroesophageal reflux disease)


ICD Codes:  K21.9 - Gastro-esophageal reflux disease without esophagitis


SNOMED:  382273260


Status:  stable


Assessment/Plan


Appreciate plastic surgery rec's


s/p right axillary and lower abdominal wall debridement with flap elevation on 

11/15/17


Plan for OR tomorrow for wound closures


Cont iV abx


F/u cultures


Post operative recommendations include:


- encourage mobilization/ambulation


- encourage incentive spirometry to optimize pulmonary hygiene


- DVT/GI prophylaxis as appropriate--SCDs, HSQ


- pain control, supportive care, bowel regimen


Appreciate cardiology rec's


TTE unremarkable


Cont home MTP


Cont home PPI





DVT ppx: SCDs, HSQ as pt is now moderate risk postop





A total of 32min of extra time was spent in addition to normal encounter time 

for care/coordination and counseling. D/w pt/family, RN, SW/CM, surgery, 

cardiology regarding mgmt and dispo





Subjective


Date patient seen:  Nov 16, 2017


Time patient seen:  16:23


ROS Limited/Unobtainable:  No


Constitutional:  Reports: no symptoms


HEENT:  Reports: no symptoms


Cardiovascular:  Reports: no symptoms


Respiratory:  Reports: cough


Gastrointestinal/Abdominal:  Reports: no symptoms


Genitourinary:  Reports: no symptoms


Neurologic/Psychiatric:  Reports: no symptoms


Hematologic/Lymphatic:  Reports: no symptoms


Allergies:  


Coded Allergies:  


     CELECOXIB (Verified  Allergy, Unknown, 11/14/17)


All Systems:  reviewed and negative except above


Subjective


No acute o/n events


s/p right axillary and lower abdominal wall debridement with flap elevation 

yesterday POD#1





Pt w/ some mild SOB, cough, congestion. She states she is requiring for URI 

symptoms over the past 1-2 weeks. Currently feeling better. Pain controlled, 

denies f/c, n/v, d/c, chest pain, palpitaitons





Objective





Last 24 Hour Vital Signs








  Date Time  Temp Pulse Resp B/P (MAP) Pulse Ox O2 Delivery O2 Flow Rate FiO2


 


11/16/17 21:40 100.5       


 


11/16/17 20:50 101.2 92 18 151/76 94 Room Air  


 


11/16/17 20:41  85  151/76    


 


11/16/17 20:00   18     


 


11/16/17 16:00 100.0 85 18 157/76 98 Room Air  


 


11/16/17 16:00   18     


 


11/16/17 12:00 98.6 78 18 124/67 97 Room Air  


 


11/16/17 12:00   18     


 


11/16/17 10:08  68 20  98   


 


11/16/17 08:57  79  130/61    


 


11/16/17 08:00   17     


 


11/16/17 08:00 99.0 79 17 130/61 96 Room Air  


 


11/16/17 04:00 97.5 76 20 107/59 97 Room Air  


 


11/16/17 04:00   18     


 


11/16/17 00:00 98.6 74 20 119/55 97 Room Air  


 


11/16/17 00:00   18     

















Intake and Output  


 


 11/16/17 11/17/17





 19:00 07:00


 


Intake Total 1875 ml 


 


Output Total 1800 ml 25 ml


 


Balance 75 ml -25 ml


 


  


 


Intake Oral 750 ml 


 


IV Total 1125 ml 


 


Output Urine Total 1800 ml 


 


Drainage Total  25 ml








Height (Feet):  5


Height (Inches):  9.00


Weight (Pounds):  280


Objective


General: alert, cooperative, no distress, appears stated age


Head: normocephalic, without obvious abnormality, atraumatic


Eyes: conjunctivae/corneas clear. PERRL, EOM's intact


Throat: lips, mucosa, and tongue normal. MMM


Neck: supple, symmetrical, trachea midline, and no JVD


Lungs: clear to auscultation bilaterally


Heart: regular rate and rhythm, S1, S2 normal, no murmur, click, rub or gallop


Abdomen: soft, non-tender, non-distended, bowel sounds normal; no masses or 

organomegaly


Extremities: extremities normal, atraumatic, no cyanosis or edema


Pulses: 2+ and symmetric


Skin: Dressing c/d/i


Neurologic: grossly normal, no focal deficits











Phil Marie M.D. Nov 16, 2017 22:30

## 2017-11-16 NOTE — 48 HOUR POST ANESTHESIA EVAL
Post Anesthesia Evaluation


Procedure:  Excision of R axillary and anterior wall HS


Date of Evaluation:  Nov 16, 2017


Time of Evaluation:  10:07


Blood Pressure Systolic:  128


0:  72


Pulse Rate:  68


Respiratory Rate:  20


Temperature (Fahrenheit):  97.5


O2 Sat by Pulse Oximetry:  98


Airway:  patent


Nausea:  No


Vomiting:  No


Pain Intensity:  3


Hydration Status:  adequate


Cardiopulmonary Status:


stable


Mental Status/LOC:  patient returned to baseline


Follow-up Care/Observations:


n/a


Post-Anesthesia Complications:


none


Follow-up care needed:  N/A











RODGER MAGANA M.D. Nov 16, 2017 10:08

## 2017-11-16 NOTE — GENERAL PROGRESS NOTE
Progress Note


Progress Note


Pt seen and examined.





POD# 1and doing well.





R axillary dressing intact and abdominal wound vac in place.





To OR in AM for wound closures.





Liz Valles M.D.











LIZ VALLES Nov 16, 2017 10:24

## 2017-11-16 NOTE — CARDIAC ELECTROPHYSIOLOGY PN
Assessment/Plan


Assessment/Plan


1. History of supraventricular tachycardia.  The patient has never had an 

ablation.  


   Denies any chest pain or shortness of breath.  Continue Lopressor but change 

to 50 bid.


2. Hypertension, Change metoprolol to 50 bid


3. Transient episode of shortness of breath and cough that was


completely resolved.  Her EKG is completely normal.  The patient does not


have any chest pain and no history of congestive heart failure.  Her


echocardiogram was normal. Stable to under go stage 2 of abdominal and axillary 

plastic surgery tomorrow.  





DW Dr Bustos the plastic surgeon..





Subjective


Subjective


Tolerated the surgery well. No perioperative cardiac issues. Felt few seconds 

of transient tachy that resolved spontaneously.





Objective





Last 24 Hour Vital Signs








  Date Time  Temp Pulse Resp B/P (MAP) Pulse Ox O2 Delivery O2 Flow Rate FiO2


 


11/16/17 10:08  68 20  98   


 


11/16/17 08:57  79  130/61    


 


11/16/17 08:00   17     


 


11/16/17 08:00 99.0 79 17 130/61 96 Room Air  


 


11/16/17 04:00 97.5 76 20 107/59 97 Room Air  


 


11/16/17 04:00   18     


 


11/16/17 00:00 98.6 74 20 119/55 97 Room Air  


 


11/16/17 00:00   18     


 


11/15/17 20:00   18     


 


11/15/17 20:00 98.1 70 20 134/56 97 Room Air  


 


11/15/17 17:42   18     


 


11/15/17 16:30  64  119/52    


 


11/15/17 16:12   18     


 


11/15/17 16:00 98.0 56 18 129/55 100 Nasal Cannula 3.0 


 


11/15/17 15:57   18     


 


11/15/17 15:55 97.6       


 


11/15/17 15:55 97.6       


 


11/15/17 15:49  57 20 137/53 100 Simple Mask 8.0 


 


11/15/17 15:42   20     


 


11/15/17 15:36  57 20 137/53 100 Simple Mask 8.0 


 


11/15/17 15:30   20     


 


11/15/17 15:20  58 20 145/58 100 Simple Mask 8.0 


 


11/15/17 15:16   20     


 


11/15/17 15:05  62 20 159/60 100 Simple Mask 8.0 


 


11/15/17 15:01  58 20  99   


 


11/15/17 14:57  63 20 144/60 100 Simple Mask 8.0 


 


11/15/17 14:52 98.0 61 20 146/66 100 Simple Mask 8.0 











Microbiology








 Date/Time


Source Procedure


Growth Status


 


 


 11/14/17 12:30


Blood Blood Culture - Preliminary


NO GROWTH AFTER 24 HOURS Resulted


 


 11/14/17 12:30


Blood Blood Culture - Preliminary


NO GROWTH AFTER 24 HOURS Resulted





 11/14/17 11:49


Wound Gram Stain - Final Resulted


 


 11/14/17 11:49 Wound Culture - Preliminary


Staphylococcus Aureus


Staphylococcus Sp Coag Neg Resulted








Objective


HEAD AND NECK:  No JVD.


LUNGS:  Clear.


CARDIOVASCULAR:  Regular S1 and S2 with no gallop or murmur.


ABDOMEN:  Soft and nontender.  Wound VAc in the lower abdomen at site of 

surgery.


EXTREMITIES:  No pitting edema.Right arm pit s/p Surgery











BAKARI RETANA Nov 16, 2017 10:37

## 2017-11-16 NOTE — ANETHESIA PREOPERATIVE EVAL
Anesthesia Pre-op PMH/ROS


General


Date of Evaluation:  Nov 16, 2017


Anesthesiologist:  Montana


ASA Score:  ASA 3


Mallampati Score


Class I : Soft palate, uvula, fauces, pillars visible


Class II: Soft palate, uvula, fauces visible


Class III: Soft palate, base of uvula visible


Class IV: Only hard plate visible


Mallampati Classification:  Class III


Surgeon:  Juli


Diagnosis:  REcurrent HS


Surgical Procedure:  Bilateral groin debriedment and flap closure


Anesthesia History:  none


Family History:  no anesthesia problems


Allergies:  


Coded Allergies:  


     CELECOXIB (Verified  Allergy, Unknown, 11/14/17)


Medications:  see eMAR





Past Medical History


Cardiovascular:  Denies: HTN, CAD, MI, valve dz, arrhythmia, other


Pulmonary:  Reports: POLY, 


   Denies: asthma, COPD, other


Gastrointestinal/Genitourinary:  Reports: GERD, 


   Denies: CRI, ESRD, other


Neurologic/Psychiatric:  Reports: depression/anxiety, 


   Denies: dementia, CVA, TIA, other


Endocrine:  Denies: DM, hypothyroidism, steroids, other


HEENT:  Denies: cataract (L), cataract (R), glaucoma, [place] (L), [place] (R), other


Hematology/Immune:  Reports: anemia - chronic, 


   Denies: DVT, bleeding disorder, other


Musculoskeletal/Integumentary:  Reports: other - fibromyalgia, recurrent HS, 


   Denies: OA, RA, DJD, DDD, edema


Other:  obesity - morbid


PSxH Narrative:


lap sarah, lithotrispy, bilateral groin debriedment





Anesthesia Pre-op Phys. Exam


Physician Exam





Last Vital Signs








  Date Time  Temp Pulse Resp B/P (MAP) Pulse Ox O2 Delivery O2 Flow Rate FiO2


 


11/16/17 12:00 98.6 78 18 124/67 97 Room Air  


 


11/15/17 16:00       3.0 








Constitutional:  NAD


Cardiovascular:  RRR


Respiratory:  other - distant breath sounds bilaterally





Airway Exam


Mallampati Score:  Class III


MO:  full


Neck:  obese, short


ROM:  full





Anesthesia Pre-op A/P


Labs


seen chart





Risk Assessment & Plan


Assessment:


ASA III


Plan:


GA


Status Change Before Surgery:  No





Pre-Antibiotics


Drug:  JOVITA LAKHANI M.D. Nov 16, 2017 13:00

## 2017-11-17 VITALS — DIASTOLIC BLOOD PRESSURE: 59 MMHG | SYSTOLIC BLOOD PRESSURE: 146 MMHG

## 2017-11-17 VITALS — SYSTOLIC BLOOD PRESSURE: 136 MMHG | DIASTOLIC BLOOD PRESSURE: 58 MMHG

## 2017-11-17 VITALS — SYSTOLIC BLOOD PRESSURE: 144 MMHG | DIASTOLIC BLOOD PRESSURE: 58 MMHG

## 2017-11-17 VITALS — DIASTOLIC BLOOD PRESSURE: 80 MMHG | SYSTOLIC BLOOD PRESSURE: 135 MMHG

## 2017-11-17 VITALS — DIASTOLIC BLOOD PRESSURE: 50 MMHG | SYSTOLIC BLOOD PRESSURE: 134 MMHG

## 2017-11-17 VITALS — SYSTOLIC BLOOD PRESSURE: 134 MMHG | DIASTOLIC BLOOD PRESSURE: 57 MMHG

## 2017-11-17 VITALS — DIASTOLIC BLOOD PRESSURE: 72 MMHG | SYSTOLIC BLOOD PRESSURE: 118 MMHG

## 2017-11-17 VITALS — DIASTOLIC BLOOD PRESSURE: 61 MMHG | SYSTOLIC BLOOD PRESSURE: 147 MMHG

## 2017-11-17 VITALS — DIASTOLIC BLOOD PRESSURE: 64 MMHG | SYSTOLIC BLOOD PRESSURE: 134 MMHG

## 2017-11-17 VITALS — SYSTOLIC BLOOD PRESSURE: 145 MMHG | DIASTOLIC BLOOD PRESSURE: 61 MMHG

## 2017-11-17 VITALS — SYSTOLIC BLOOD PRESSURE: 120 MMHG | DIASTOLIC BLOOD PRESSURE: 67 MMHG

## 2017-11-17 VITALS — DIASTOLIC BLOOD PRESSURE: 58 MMHG | SYSTOLIC BLOOD PRESSURE: 121 MMHG

## 2017-11-17 PROCEDURE — 0JX80ZC TRANSFER ABDOMEN SUBCUTANEOUS TISSUE AND FASCIA WITH SKIN, SUBCUTANEOUS TISSUE AND FASCIA, OPEN APPROACH: ICD-10-PCS

## 2017-11-17 PROCEDURE — 0JXD0ZC TRANSFER RIGHT UPPER ARM SUBCUTANEOUS TISSUE AND FASCIA WITH SKIN, SUBCUTANEOUS TISSUE AND FASCIA, OPEN APPROACH: ICD-10-PCS

## 2017-11-17 PROCEDURE — 0JD80ZZ EXTRACTION OF ABDOMEN SUBCUTANEOUS TISSUE AND FASCIA, OPEN APPROACH: ICD-10-PCS

## 2017-11-17 PROCEDURE — 0JDD0ZZ EXTRACTION OF RIGHT UPPER ARM SUBCUTANEOUS TISSUE AND FASCIA, OPEN APPROACH: ICD-10-PCS

## 2017-11-17 RX ADMIN — DEXTROSE AND SODIUM CHLORIDE SCH MLS/HR: 5; .45 INJECTION, SOLUTION INTRAVENOUS at 11:20

## 2017-11-17 RX ADMIN — GUAIFENESIN SCH MG: 600 TABLET, EXTENDED RELEASE ORAL at 08:29

## 2017-11-17 RX ADMIN — Medication PRN MLS/HR: at 12:46

## 2017-11-17 RX ADMIN — METOPROLOL TARTRATE SCH MG: 50 TABLET, FILM COATED ORAL at 08:36

## 2017-11-17 RX ADMIN — HEPARIN SODIUM SCH UNITS: 5000 INJECTION INTRAVENOUS; SUBCUTANEOUS at 08:29

## 2017-11-17 RX ADMIN — GUAIFENESIN SCH MG: 600 TABLET, EXTENDED RELEASE ORAL at 17:45

## 2017-11-17 RX ADMIN — DOCUSATE SODIUM SCH MG: 100 CAPSULE, LIQUID FILLED ORAL at 20:39

## 2017-11-17 RX ADMIN — SODIUM CHLORIDE SCH MLS/HR: 0.9 INJECTION INTRAVENOUS at 17:45

## 2017-11-17 RX ADMIN — DIPHENHYDRAMINE HYDROCHLORIDE PRN MG: 50 INJECTION INTRAMUSCULAR; INTRAVENOUS at 20:38

## 2017-11-17 RX ADMIN — DOCUSATE SODIUM SCH MG: 100 CAPSULE, LIQUID FILLED ORAL at 08:29

## 2017-11-17 RX ADMIN — SODIUM CHLORIDE SCH MLS/HR: 0.9 INJECTION INTRAVENOUS at 10:00

## 2017-11-17 RX ADMIN — SODIUM CHLORIDE SCH MLS/HR: 0.9 INJECTION INTRAVENOUS at 02:15

## 2017-11-17 RX ADMIN — METOPROLOL TARTRATE SCH MG: 50 TABLET, FILM COATED ORAL at 20:39

## 2017-11-17 RX ADMIN — HEPARIN SODIUM SCH UNITS: 5000 INJECTION INTRAVENOUS; SUBCUTANEOUS at 20:40

## 2017-11-17 NOTE — CARDIAC ELECTROPHYSIOLOGY PN
Assessment/Plan


Assessment/Plan


1. History of supraventricular tachycardia.   Denies any chest pain or 

shortness of breath.  Continue Lopressor  50 bid.


2. Hypertension, On Lopressor 50 bid


3. Transient episode of shortness of breath and cough that was completely 

resolved.    EKG is completely normal.     


echocardiogram was normal.





Subjective


Subjective


Tolerated the second surgery today without any cardiac issues.





Objective





Last 24 Hour Vital Signs








  Date Time  Temp Pulse Resp B/P (MAP) Pulse Ox O2 Delivery O2 Flow Rate FiO2


 


11/17/17 14:45  61 16  98   


 


11/17/17 13:30   17     


 


11/17/17 13:15   16     


 


11/17/17 13:10 98.0 61 13 134/50 99 Nasal Cannula 3.0 


 


11/17/17 13:00 98.0       


 


11/17/17 13:00 98.0       


 


11/17/17 13:00  60 15 144/58 99 Nasal Cannula 3.0 


 


11/17/17 13:00   15     


 


11/17/17 12:46   14     


 


11/17/17 12:45  58 14 136/58 99 Simple Mask 6.0 


 


11/17/17 12:35  63 17 144/58 99 Simple Mask 6.0 


 


11/17/17 12:30  61 15 147/61 99 Simple Mask 6.0 


 


11/17/17 12:15  63 16 146/59 98 Simple Mask 6.0 


 


11/17/17 12:10  65 16 144/58 98 Simple Mask 6.0 


 


11/17/17 12:06  72 20  98   


 


11/17/17 12:05  65 17 134/57 98 Simple Mask 6.0 


 


11/17/17 12:00  71 18 146/59 97 Simple Mask 6.0 


 


11/17/17 11:57 98.8 72 18 145/61 97 Simple Mask 6.0 


 


11/17/17 08:36  78  120/64    


 


11/17/17 08:00 99.4 78 18 120/67 94 Room Air  


 


11/17/17 08:00   17     


 


11/17/17 04:59 99.8 79 18 118/72 94 Room Air  


 


11/17/17 04:00   18     


 


11/17/17 00:34 98.8 87 20 134/64 95 Room Air  


 


11/17/17 00:00   18     


 


11/16/17 21:40 100.5       


 


11/16/17 20:50 101.2 92 18 151/76 94 Room Air  


 


11/16/17 20:41  85  151/76    


 


11/16/17 20:00   18     


 


11/16/17 16:00 100.0 85 18 157/76 98 Room Air  


 


11/16/17 16:00   18     

















Intake and Output  


 


 11/17/17 11/18/17





 19:00 07:00


 


Intake Total 1500 ml 


 


Output Total 620 ml 


 


Balance 880 ml 


 


  


 


IV Total 1500 ml 


 


Output Urine Total 500 ml 


 


Drainage Total 70 ml 


 


Estimated Blood Loss 50 ml 








Objective


HEAD AND NECK:  No JVD.


LUNGS:  Clear.


CARDIOVASCULAR:  Regular S1 and S2 with no gallop or murmur.


ABDOMEN:  Soft and nontender.  Lower abdomen s/P surgery.


EXTREMITIES:  No pitting edema.Right arm pit s/p Surgery with drain











BAKARI RETANA Nov 17, 2017 15:27

## 2017-11-17 NOTE — PRE-PROCEDURE NOTE/ATTESTATION
Pre-Procedure Note/Attestation


Complete Prior to Procedure


Planned Procedure:  right


Procedure Narrative:


Closure of right axillary wound and abdominal wound closure





Attestation


I attest that I discussed the nature of the procedure; its benefits; risks and 

complications; and alternatives (and the risks and benefits of such alternatives

), prior to the procedure, with the patient (or the patient's legal 

representative).





I attest that, if there was a reasonable possibility of needing a blood 

transfusion, the patient (or the patient's legal representative) was given the 

[facility] standardized written summary, pursuant 

to the [place] Blood Safety Act ([place] Health and Safety Code # 1645, as 

amended).





I attest that I re-evaluated the patient just prior to the surgery and that 

there has been no change in the patient's H&P, except as documented below:











LIZ VALLES Nov 17, 2017 08:57

## 2017-11-17 NOTE — OPERATIVE NOTE - PDOC
Operative Note


Operative Note


Procedure:


Closure of right axillary and abdominal wounds


Post-op Diagnosis:  same as pre-op


Surgeon:  Nkechi


Assistant:  Juli


Specimen:  yes


Complications:  none


Condition:  stable


Estimated Blood Loss:  minimal


Drains:  VERNON


Implant(s) used?:  No











LIZ VALLES Nov 17, 2017 11:46

## 2017-11-17 NOTE — GENERAL PROGRESS NOTE
Assessment/Plan


Problem List:  


(1) Abscess


ICD Codes:  L02.91 - Cutaneous abscess, unspecified


SNOMED:  030832899


(2) Hidradenitis suppurativa


ICD Codes:  L73.2 - Hidradenitis suppurativa


SNOMED:  27498726


(3) Paroxysmal atrial tachycardia


ICD Codes:  I47.1 - Supraventricular tachycardia


SNOMED:  701832219


(4) HTN (hypertension)


ICD Codes:  I10 - Essential (primary) hypertension


SNOMED:  86477532


(5) GERD (gastroesophageal reflux disease)


ICD Codes:  K21.9 - Gastro-esophageal reflux disease without esophagitis


SNOMED:  767177530


Status:  stable


Assessment/Plan


Appreciate plastic surgery rec's


s/p right axillary and lower abdominal wall debridement with flap elevation on 

11/15/17


s/p closure of right axillary and abdominal wounds on 11/17/17


Cont iV abx


F/u cultures


Post operative recommendations include:


- encourage mobilization/ambulation


- encourage incentive spirometry to optimize pulmonary hygiene


- DVT/GI prophylaxis as appropriate--SCDs, HSQ


- pain control, supportive care, bowel regimen


Appreciate cardiology rec's


TTE unremarkable


Cont home MTP


Cont home PPI





DVT ppx: SCDs, HSQ as pt is now moderate risk postop





A total of 33min of extra time was spent in addition to normal encounter time 

for care/coordination and counseling. D/w pt/family, RN, SW/CM, surgery, 

cardiology regarding mgmt and dispo





Subjective


Date patient seen:  Nov 17, 2017


Time patient seen:  13:00


ROS Limited/Unobtainable:  No


Constitutional:  Reports: no symptoms


HEENT:  Reports: no symptoms


Cardiovascular:  Reports: no symptoms


Respiratory:  Reports: cough


Genitourinary:  Reports: no symptoms


Neurologic/Psychiatric:  Reports: no symptoms


Endocrine:  Reports: no symptoms


Hematologic/Lymphatic:  Reports: no symptoms


Allergies:  


Coded Allergies:  


     CELECOXIB (Verified  Allergy, Unknown, 11/14/17)


All Systems:  reviewed and negative except above


Subjective


No acute o/n events


s/p right axillary and lower abdominal wall debridement with flap elevation POD#

2


s/p closure of right axillary and abdominal wounds today





Pt doing well. Not as much as nausea as last time.Pain controlled, denies f/c, d

/c, chest pain, palpitations





Objective





Last 24 Hour Vital Signs








  Date Time  Temp Pulse Resp B/P (MAP) Pulse Ox O2 Delivery O2 Flow Rate FiO2


 


11/17/17 16:00   18     


 


11/17/17 16:00 98.1 102 18 135/80 99 Room Air  


 


11/17/17 15:25      Nasal Cannula 3.0 32


 


11/17/17 15:25     93 Nasal Cannula 3.0 32


 


11/17/17 14:45  61 16  98   


 


11/17/17 13:30   17     


 


11/17/17 13:15   16     


 


11/17/17 13:10 98.0 61 13 134/50 99 Nasal Cannula 3.0 


 


11/17/17 13:00 98.0       


 


11/17/17 13:00 98.0       


 


11/17/17 13:00  60 15 144/58 99 Nasal Cannula 3.0 


 


11/17/17 13:00   15     


 


11/17/17 12:46   14     


 


11/17/17 12:45  58 14 136/58 99 Simple Mask 6.0 


 


11/17/17 12:35  63 17 144/58 99 Simple Mask 6.0 


 


11/17/17 12:30  61 15 147/61 99 Simple Mask 6.0 


 


11/17/17 12:15  63 16 146/59 98 Simple Mask 6.0 


 


11/17/17 12:10  65 16 144/58 98 Simple Mask 6.0 


 


11/17/17 12:06  72 20  98   


 


11/17/17 12:05  65 17 134/57 98 Simple Mask 6.0 


 


11/17/17 12:00  71 18 146/59 97 Simple Mask 6.0 


 


11/17/17 11:57 98.8 72 18 145/61 97 Simple Mask 6.0 


 


11/17/17 08:36  78  120/64    


 


11/17/17 08:00 99.4 78 18 120/67 94 Room Air  


 


11/17/17 08:00   17     


 


11/17/17 04:59 99.8 79 18 118/72 94 Room Air  


 


11/17/17 04:00   18     


 


11/17/17 00:34 98.8 87 20 134/64 95 Room Air  


 


11/17/17 00:00   18     


 


11/16/17 21:40 100.5       


 


11/16/17 20:50 101.2 92 18 151/76 94 Room Air  


 


11/16/17 20:41  85  151/76    


 


11/16/17 20:00   18     

















Intake and Output  


 


 11/17/17 11/18/17





 19:00 07:00


 


Intake Total 1740 ml 


 


Output Total 2020 ml 


 


Balance -280 ml 


 


  


 


Intake Oral 240 ml 


 


IV Total 1500 ml 


 


Output Urine Total 1900 ml 


 


Drainage Total 70 ml 


 


Estimated Blood Loss 50 ml 








Height (Feet):  5


Height (Inches):  9.00


Weight (Pounds):  280


Objective


General: alert, cooperative, no distress, appears stated age


Head: normocephalic, without obvious abnormality, atraumatic


Eyes: conjunctivae/corneas clear. PERRL, EOM's intact


Throat: lips, mucosa, and tongue normal. MMM


Neck: supple, symmetrical, trachea midline, and no JVD


Lungs: clear to auscultation bilaterally


Heart: regular rate and rhythm, S1, S2 normal, no murmur, click, rub or gallop


Abdomen: soft, non-tender, non-distended, bowel sounds normal; no masses or 

organomegaly


Extremities: extremities normal, atraumatic, no cyanosis or edema


Pulses: 2+ and symmetric


Skin: Dressing c/d/i


Neurologic: grossly normal, no focal deficits











Phil Marie M.D. Nov 17, 2017 18:54

## 2017-11-17 NOTE — OPERATIVE NOTE - DICTATED
DATE OF OPERATION:  11/17/2017



PREOPERATIVE DIAGNOSES:

1. Open abdominal wound.

2. Open right axillary wound.



POSTOPERATIVE DIAGNOSES:

1. Open abdominal wound.

2. Open right axillary wound.



PROCEDURES:

1. Preparation of lower abdominal wound for flap closure.

2. Upper abdominal flap advancement closure of lower abdominal wound.

3. Preparation of right axillary wound for flap closure.

4. Thoracodorsal artery flap readvancement for closure of right axillary

wound.



SURGEON:  Kristie Bustos M.D.



ASSISTANT:  Moses Boateng M.D.



ANESTHESIA:  General.



DRAINS:  Included two JPs in the abdomen, one in the right axilla.



DISPOSITION:  Stable to the recovery room.



INDICATIONS FOR SURGERY:  This is a 42-year-old female, who is now 48 hours

status post radical excision of lower abdominal wound infected tissue as

well as radical excision of infected right axillary tissue.  She undergone

this procedure at that time with flap elevation.  She undergone local

wound care to those areas as well as wound VAC treatment of the lower

abdominal wound and is now ready for definitive wound closure.  She

understood the risks and benefits of surgery and agreed to proceed.



DETAILS OF THE OPERATION:  The patient was brought to the operating room

and laid in the supine position on the operating table.  The right axilla

and abdomen were prepped and draped in a sterile and usual fashion.  We

first began by addressing the abdominal wound by debriding some of the

nonviable tissue at the base and pulse lavage irrigation was used to

irrigate the area and after hemostasis was achieved, the wound was

prepared and ready for upper abdominal flap advancement.  The upper

abdominal flap was released further by performing relaxing incisions on

the lateral aspect of the flap ______ further advance we were able to

accomplish a tension-free closure by closing the wound to the lower edge

using #0 and 2-0 Vicryl sutures and multiple staples were used to close

the skin.  Prior to definitive closure, we placed 2 size 15 JPs within the

wound.  We then turned our attention to the right axillary wound.  The

wound was prepared by debriding some of the nonviable tissues and after

pulse lavage irrigation and hemostasis was achieved.  The thoracodorsal

artery flap that was based off the lateral chest wall was then reelevated

and placed into the axillary defect and inset using #0 and 2-0 Vicryl

sutures and the donor site on the lateral chest wall was closed by

advancing the anterior chest wall tissue to the posterior back tissue and

reapproximating those edges by #0 and 2-0 Vicryl sutures and then all of

the donor sites skin as well as skin closure for thoracodorsal artery

flap, these were all closed using 2-0 Prolene sutures.  Dressings were

applied.  The patient tolerated the procedure well.  There was no

complications.









  ______________________________________________

  Kristie Bustos M.D.





DR:  VASHTI

D:  11/17/2017 11:49

T:  11/17/2017 21:51

JOB#:  [id number]

CC:

## 2017-11-17 NOTE — IMMEDIATE POST-OP EVALUATION
Immediate Post-Op Evalulation


Immediate Post-Op Evalulation


Procedure:  Revision and closure of anterior abdominal and R axillary wounds


Date of Evaluation:  Nov 17, 2017


Time of Evaluation:  12:05


IV Fluids:  1200


Blood Products:  none


Estimated Blood Loss:  50


Urinary Output:  500


Blood Pressure Systolic:  145


Blood Pressure Diastolic:  74


Pulse Rate:  72


Respiratory Rate:  20


O2 Sat by Pulse Oximetry:  98


Temperature (Fahrenheit):  98.6


Pain Score (1-10):  2


Nausea:  No


Vomiting:  No


Complications


none


Patient Status:  reacts, patent, extubated, none


Hydration Status:  adequate











RODGER MAGANA M.D. Nov 17, 2017 12:06

## 2017-11-17 NOTE — 48 HOUR POST ANESTHESIA EVAL
Post Anesthesia Evaluation


Procedure:  Revision and closure of anterior abdominal and R axillary wounds


Date of Evaluation:  Nov 17, 2017


Time of Evaluation:  14:45


Blood Pressure Systolic:  134


0:  50


Pulse Rate:  61


Respiratory Rate:  16


Temperature (Fahrenheit):  99


O2 Sat by Pulse Oximetry:  98


Airway:  patent


Nausea:  No


Vomiting:  No


Pain Intensity:  2


Hydration Status:  adequate


Cardiopulmonary Status:


Stable


Mental Status/LOC:  patient returned to baseline


Follow-up Care/Observations:


0


Post-Anesthesia Complications:


0


Follow-up care needed:  N/A











Narciso Mccoy MD Nov 17, 2017 14:45

## 2017-11-18 VITALS — SYSTOLIC BLOOD PRESSURE: 116 MMHG | DIASTOLIC BLOOD PRESSURE: 63 MMHG

## 2017-11-18 VITALS — SYSTOLIC BLOOD PRESSURE: 130 MMHG | DIASTOLIC BLOOD PRESSURE: 68 MMHG

## 2017-11-18 VITALS — DIASTOLIC BLOOD PRESSURE: 65 MMHG | SYSTOLIC BLOOD PRESSURE: 131 MMHG

## 2017-11-18 VITALS — DIASTOLIC BLOOD PRESSURE: 59 MMHG | SYSTOLIC BLOOD PRESSURE: 114 MMHG

## 2017-11-18 VITALS — DIASTOLIC BLOOD PRESSURE: 65 MMHG | SYSTOLIC BLOOD PRESSURE: 118 MMHG

## 2017-11-18 VITALS — DIASTOLIC BLOOD PRESSURE: 61 MMHG | SYSTOLIC BLOOD PRESSURE: 117 MMHG

## 2017-11-18 LAB
ALT SERPL-CCNC: 13 U/L (ref 12–78)
ANION GAP SERPL CALC-SCNC: 6 MMOL/L (ref 5–15)
AST SERPL-CCNC: 10 U/L (ref 15–37)
BASOPHILS NFR BLD AUTO: 0.2 % (ref 0–2)
BILIRUB DIRECT SERPL-MCNC: < 0.1 MG/DL (ref 0–0.3)
CALCIUM SERPL-MCNC: 8.7 MG/DL (ref 8.5–10.1)
CHLORIDE SERPL-SCNC: 105 MMOL/L (ref 98–107)
CO2 SERPL-SCNC: 28 MMOL/L (ref 21–32)
CREAT SERPL-MCNC: 0.8 MG/DL (ref 0.55–1.3)
EOSINOPHIL NFR BLD AUTO: 0.1 % (ref 0–3)
ERYTHROCYTE [DISTWIDTH] IN BLOOD BY AUTOMATED COUNT: 12.7 % (ref 11.6–14.8)
GFR SERPLBLD BASED ON 1.73 SQ M-ARVRAT: > 60 ML/MIN (ref 60–?)
LYMPHOCYTES NFR BLD AUTO: 9.3 % (ref 20–45)
MAGNESIUM SERPL-MCNC: 2.1 MG/DL (ref 1.8–2.4)
MCH RBC QN AUTO: 28.9 PG (ref 27–31)
MCHC RBC AUTO-ENTMCNC: 32.8 G/DL (ref 32–36)
MCV RBC AUTO: 88 FL (ref 80–99)
MONOCYTES NFR BLD AUTO: 6.3 % (ref 1–10)
NEUTROPHILS NFR BLD AUTO: 84.1 % (ref 45–75)
PLATELET # BLD: 319 K/UL (ref 150–450)
PMV BLD AUTO: 6.3 FL (ref 6.5–10.1)
POTASSIUM SERPL-SCNC: 4 MMOL/L (ref 3.5–5.1)
PROT SERPL-MCNC: 6.9 G/DL (ref 6.4–8.2)
RBC # BLD AUTO: 3.81 M/UL (ref 4.2–5.4)
SODIUM SERPL-SCNC: 139 MMOL/L (ref 136–145)
WBC # BLD AUTO: 14.6 K/UL (ref 4.8–10.8)

## 2017-11-18 RX ADMIN — DIPHENHYDRAMINE HYDROCHLORIDE PRN MG: 50 INJECTION INTRAMUSCULAR; INTRAVENOUS at 23:05

## 2017-11-18 RX ADMIN — METOPROLOL TARTRATE SCH MG: 50 TABLET, FILM COATED ORAL at 09:25

## 2017-11-18 RX ADMIN — HEPARIN SODIUM SCH UNITS: 5000 INJECTION INTRAVENOUS; SUBCUTANEOUS at 09:27

## 2017-11-18 RX ADMIN — SODIUM CHLORIDE SCH MLS/HR: 0.9 INJECTION INTRAVENOUS at 01:28

## 2017-11-18 RX ADMIN — DEXTROSE AND SODIUM CHLORIDE SCH MLS/HR: 5; .45 INJECTION, SOLUTION INTRAVENOUS at 00:48

## 2017-11-18 RX ADMIN — DOCUSATE SODIUM SCH MG: 100 CAPSULE, LIQUID FILLED ORAL at 21:26

## 2017-11-18 RX ADMIN — METOPROLOL TARTRATE SCH MG: 50 TABLET, FILM COATED ORAL at 21:26

## 2017-11-18 RX ADMIN — SODIUM CHLORIDE SCH MLS/HR: 0.9 INJECTION INTRAVENOUS at 09:25

## 2017-11-18 RX ADMIN — GUAIFENESIN SCH MG: 600 TABLET, EXTENDED RELEASE ORAL at 09:26

## 2017-11-18 RX ADMIN — DOCUSATE SODIUM SCH MG: 100 CAPSULE, LIQUID FILLED ORAL at 09:25

## 2017-11-18 RX ADMIN — HEPARIN SODIUM SCH UNITS: 5000 INJECTION INTRAVENOUS; SUBCUTANEOUS at 21:27

## 2017-11-18 RX ADMIN — Medication PRN MLS/HR: at 13:00

## 2017-11-18 RX ADMIN — SODIUM CHLORIDE SCH MLS/HR: 0.9 INJECTION INTRAVENOUS at 17:19

## 2017-11-18 NOTE — CARDIAC ELECTROPHYSIOLOGY PN
Assessment/Plan


Assessment/Plan


1. History of supraventricular tachycardia.   Denies any chest pain or 

shortness of breath.  


   Continue Lopressor  50 bid. No recurrence of SVT


2. Hypertension, On Lopressor 50 bid


3. Transient episode of shortness of breath and cough that was completely 

resolved.    EKG is completely normal.     


echocardiogram was normal. 


4. Hydradenitis of the Righ arm pit and lowed abdomen, s/p surgery twice. Doing 

well. Still has  the drains.





BRAIN RN





Subjective


Subjective


Comfortable in NAD. No palpitation or SVT.





Objective





Last 24 Hour Vital Signs








  Date Time  Temp Pulse Resp B/P (MAP) Pulse Ox O2 Delivery O2 Flow Rate FiO2


 


11/18/17 11:56 98.2 56 20 114/59 93 Room Air  


 


11/18/17 09:25  60  116/63    


 


11/18/17 08:16 98.7 60 20 116/63 96 Room Air  


 


11/18/17 08:00   17     


 


11/18/17 08:00 98.2       


 


11/18/17 08:00 98.2       


 


11/18/17 07:00      Nasal Cannula 3.0 32


 


11/18/17 04:00 98.5 80 18 117/61 98 Nasal Cannula 2.0 


 


11/18/17 04:00   17     


 


11/18/17 03:28      Nasal Cannula 3.0 32


 


11/18/17 03:28     97 Nasal Cannula 3.0 32


 


11/18/17 00:00 98.2 65 18 131/65 98 Nasal Cannula 2.0 


 


11/18/17 00:00   16     


 


11/17/17 20:39  68  121/58    


 


11/17/17 20:00 98.9 68 17 121/58 98 Room Air 3.0 


 


11/17/17 20:00   17     

















Intake and Output  


 


 11/18/17 11/19/17





 19:00 07:00


 


Intake Total 860 ml 


 


Output Total 800 ml 


 


Balance 60 ml 


 


  


 


Intake Oral 860 ml 


 


Output Urine Total 800 ml 


 


# Voids 1 











Laboratory Tests








Test


  11/18/17


05:20


 


White Blood Count


  14.6 K/UL


(4.8-10.8)  H


 


Red Blood Count


  3.81 M/UL


(4.20-5.40)  L


 


Hemoglobin


  11.0 G/DL


(12.0-16.0)  L


 


Hematocrit


  33.6 %


(37.0-47.0)  L


 


Mean Corpuscular Volume 88 FL (80-99)  


 


Mean Corpuscular Hemoglobin


  28.9 PG


(27.0-31.0)


 


Mean Corpuscular Hemoglobin


Concent 32.8 G/DL


(32.0-36.0)


 


Red Cell Distribution Width


  12.7 %


(11.6-14.8)


 


Platelet Count


  319 K/UL


(150-450)


 


Mean Platelet Volume


  6.3 FL


(6.5-10.1)  L


 


Neutrophils (%) (Auto)


  84.1 %


(45.0-75.0)  H


 


Lymphocytes (%) (Auto)


  9.3 %


(20.0-45.0)  L


 


Monocytes (%) (Auto)


  6.3 %


(1.0-10.0)


 


Eosinophils (%) (Auto)


  0.1 %


(0.0-3.0)


 


Basophils (%) (Auto)


  0.2 %


(0.0-2.0)


 


Sodium Level


  139 MMOL/L


(136-145)


 


Potassium Level


  4.0 MMOL/L


(3.5-5.1)


 


Chloride Level


  105 MMOL/L


()


 


Carbon Dioxide Level


  28 MMOL/L


(21-32)


 


Anion Gap


  6 mmol/L


(5-15)


 


Blood Urea Nitrogen


  9 mg/dL (7-18)


 


 


Creatinine


  0.8 MG/DL


(0.55-1.30)


 


Estimat Glomerular Filtration


Rate > 60 mL/min


(>60)


 


Glucose Level


  124 MG/DL


()  H


 


Calcium Level


  8.7 MG/DL


(8.5-10.1)


 


Magnesium Level


  2.1 MG/DL


(1.8-2.4)


 


Total Bilirubin


  0.2 MG/DL


(0.2-1.0)


 


Direct Bilirubin


  < 0.1 MG/DL


(0.0-0.3)


 


Aspartate Amino Transf


(AST/SGOT) 10 U/L (15-37)


L


 


Alanine Aminotransferase


(ALT/SGPT) 13 U/L (12-78)


 


 


Alkaline Phosphatase


  54 U/L


()


 


Total Protein


  6.9 G/DL


(6.4-8.2)


 


Albumin


  2.9 G/DL


(3.4-5.0)  L








Objective


HEAD AND NECK:  No JVD.


LUNGS:  Clear.


CARDIOVASCULAR:  Regular S1 and S2 with no gallop or murmur.


ABDOMEN:  Soft and nontender.  Lower abdomen s/P surgery with drains.


EXTREMITIES:  No pitting edema.Right arm pit s/p Surgery with drain











BAKARI RETANA Nov 18, 2017 16:47

## 2017-11-18 NOTE — GENERAL PROGRESS NOTE
Assessment/Plan


Problem List:  


(1) Abscess


ICD Codes:  L02.91 - Cutaneous abscess, unspecified


SNOMED:  376457844


(2) Hidradenitis suppurativa


ICD Codes:  L73.2 - Hidradenitis suppurativa


SNOMED:  73561599


(3) Paroxysmal atrial tachycardia


ICD Codes:  I47.1 - Supraventricular tachycardia


SNOMED:  447042688


(4) HTN (hypertension)


ICD Codes:  I10 - Essential (primary) hypertension


SNOMED:  22070110


(5) GERD (gastroesophageal reflux disease)


ICD Codes:  K21.9 - Gastro-esophageal reflux disease without esophagitis


SNOMED:  345738332


(6) Acute blood loss anemia


ICD Codes:  D62 - Acute posthemorrhagic anemia


SNOMED:  362490049


Status:  stable


Assessment/Plan


Appreciate plastic surgery rec's


s/p right axillary and lower abdominal wall debridement with flap elevation on 

11/15/17


s/p closure of right axillary and abdominal wounds on 11/17/17


Cont iV abx


F/u cultures


Post operative recommendations include:


- encourage mobilization/ambulation


- encourage incentive spirometry to optimize pulmonary hygiene


- DVT/GI prophylaxis as appropriate--SCDs, HSQ


- pain control, supportive care, bowel regimen


Appreciate cardiology rec's


TTE unremarkable


Cont home MTP


Cont home PPI


Mucinex PRN





DVT ppx: SCDs, HSQ as pt is now moderate risk postop





A total of 31min of extra time was spent in addition to normal encounter time 

for care/coordination and counseling. D/w pt/family, RN, SW/CM, surgery, 

cardiology regarding mgmt and dispo





Subjective


Date patient seen:  Nov 18, 2017


Time patient seen:  15:00


ROS Limited/Unobtainable:  No


Constitutional:  Reports: no symptoms


HEENT:  Reports: no symptoms


Cardiovascular:  Reports: no symptoms


Respiratory:  Reports: cough


Gastrointestinal/Abdominal:  Reports: no symptoms


Genitourinary:  Reports: no symptoms


Neurologic/Psychiatric:  Reports: no symptoms


Endocrine:  Reports: no symptoms


Hematologic/Lymphatic:  Reports: no symptoms


Allergies:  


Coded Allergies:  


     CELECOXIB (Verified  Allergy, Unknown, 11/14/17)


All Systems:  reviewed and negative except above


Subjective


No acute o/n events


s/p right axillary and lower abdominal wall debridement with flap elevation POD#

3


s/p closure of right axillary and abdominal wounds POD#1





Pt doing well. Nausea improved, no emesis. Pain controlled, denies f/c, d/c, 

chest pain, palpitations





Objective





Last 24 Hour Vital Signs








  Date Time  Temp Pulse Resp B/P (MAP) Pulse Ox O2 Delivery O2 Flow Rate FiO2


 


11/18/17 21:26  71  118/65    


 


11/18/17 20:23 99.4 71 18 118/65 95 Room Air  


 


11/18/17 16:00 98.7 61 20 130/68 98 Room Air  


 


11/18/17 16:00   18     


 


11/18/17 13:00   18     


 


11/18/17 12:00   17     


 


11/18/17 11:56 98.2 56 20 114/59 93 Room Air  


 


11/18/17 09:25  60  116/63    


 


11/18/17 08:16 98.7 60 20 116/63 96 Room Air  


 


11/18/17 08:00   17     


 


11/18/17 08:00 98.2       


 


11/18/17 08:00 98.2       


 


11/18/17 07:00      Nasal Cannula 3.0 32


 


11/18/17 04:00 98.5 80 18 117/61 98 Nasal Cannula 2.0 


 


11/18/17 04:00   17     


 


11/18/17 03:28      Nasal Cannula 3.0 32


 


11/18/17 03:28     97 Nasal Cannula 3.0 32


 


11/18/17 00:00 98.2 65 18 131/65 98 Nasal Cannula 2.0 


 


11/18/17 00:00   16     

















Intake and Output  


 


 11/18/17 11/19/17





 19:00 07:00


 


Intake Total 860 ml 


 


Output Total 800 ml 


 


Balance 60 ml 


 


  


 


Intake Oral 860 ml 


 


Output Urine Total 800 ml 


 


# Voids 1 








Laboratory Tests


11/18/17 05:20: 


White Blood Count 14.6H, Red Blood Count 3.81L, Hemoglobin 11.0L, Hematocrit 

33.6L, Mean Corpuscular Volume 88, Mean Corpuscular Hemoglobin 28.9, Mean 

Corpuscular Hemoglobin Concent 32.8, Red Cell Distribution Width 12.7, Platelet 

Count 319, Mean Platelet Volume 6.3L, Neutrophils (%) (Auto) 84.1H, Lymphocytes 

(%) (Auto) 9.3L, Monocytes (%) (Auto) 6.3, Eosinophils (%) (Auto) 0.1, 

Basophils (%) (Auto) 0.2, Sodium Level 139, Potassium Level 4.0, Chloride Level 

105, Carbon Dioxide Level 28, Anion Gap 6, Blood Urea Nitrogen 9, Creatinine 0.8

, Estimat Glomerular Filtration Rate > 60, Glucose Level 124H, Calcium Level 8.7

, Magnesium Level 2.1, Total Bilirubin 0.2, Direct Bilirubin < 0.1, Aspartate 

Amino Transf (AST/SGOT) 10L, Alanine Aminotransferase (ALT/SGPT) 13, Alkaline 

Phosphatase 54, Total Protein 6.9, Albumin 2.9L


Height (Feet):  5


Height (Inches):  9.00


Weight (Pounds):  280


Objective


General: alert, cooperative, no distress, appears stated age


Head: normocephalic, without obvious abnormality, atraumatic


Eyes: conjunctivae/corneas clear. PERRL, EOM's intact


Throat: lips, mucosa, and tongue normal. MMM


Neck: supple, symmetrical, trachea midline, and no JVD


Lungs: clear to auscultation bilaterally


Heart: regular rate and rhythm, S1, S2 normal, no murmur, click, rub or gallop


Abdomen: soft, non-tender, non-distended, bowel sounds normal; no masses or 

organomegaly


Extremities: extremities normal, atraumatic, no cyanosis or edema


Pulses: 2+ and symmetric


Skin: Dressing c/d/i


Neurologic: grossly normal, no focal deficits











Phil Marie M.D. Nov 18, 2017 23:42

## 2017-11-19 VITALS — SYSTOLIC BLOOD PRESSURE: 155 MMHG | DIASTOLIC BLOOD PRESSURE: 81 MMHG

## 2017-11-19 VITALS — SYSTOLIC BLOOD PRESSURE: 130 MMHG | DIASTOLIC BLOOD PRESSURE: 65 MMHG

## 2017-11-19 VITALS — DIASTOLIC BLOOD PRESSURE: 72 MMHG | SYSTOLIC BLOOD PRESSURE: 143 MMHG

## 2017-11-19 VITALS — SYSTOLIC BLOOD PRESSURE: 140 MMHG | DIASTOLIC BLOOD PRESSURE: 73 MMHG

## 2017-11-19 VITALS — SYSTOLIC BLOOD PRESSURE: 134 MMHG | DIASTOLIC BLOOD PRESSURE: 75 MMHG

## 2017-11-19 VITALS — DIASTOLIC BLOOD PRESSURE: 89 MMHG | SYSTOLIC BLOOD PRESSURE: 174 MMHG

## 2017-11-19 RX ADMIN — SODIUM CHLORIDE SCH MLS/HR: 0.9 INJECTION INTRAVENOUS at 09:31

## 2017-11-19 RX ADMIN — HEPARIN SODIUM SCH UNITS: 5000 INJECTION INTRAVENOUS; SUBCUTANEOUS at 09:32

## 2017-11-19 RX ADMIN — SODIUM CHLORIDE SCH MLS/HR: 0.9 INJECTION INTRAVENOUS at 02:29

## 2017-11-19 RX ADMIN — METOPROLOL TARTRATE SCH MG: 50 TABLET, FILM COATED ORAL at 21:02

## 2017-11-19 RX ADMIN — DOCUSATE SODIUM SCH MG: 100 CAPSULE, LIQUID FILLED ORAL at 21:02

## 2017-11-19 RX ADMIN — Medication PRN MLS/HR: at 13:33

## 2017-11-19 RX ADMIN — POLYETHYLENE GLYCOL 3350 SCH GM: 17 POWDER, FOR SOLUTION ORAL at 09:31

## 2017-11-19 RX ADMIN — HEPARIN SODIUM SCH UNITS: 5000 INJECTION INTRAVENOUS; SUBCUTANEOUS at 21:05

## 2017-11-19 RX ADMIN — SODIUM CHLORIDE SCH MLS/HR: 0.9 INJECTION INTRAVENOUS at 17:00

## 2017-11-19 RX ADMIN — METOPROLOL TARTRATE SCH MG: 50 TABLET, FILM COATED ORAL at 09:31

## 2017-11-19 RX ADMIN — DOCUSATE SODIUM SCH MG: 100 CAPSULE, LIQUID FILLED ORAL at 09:31

## 2017-11-19 NOTE — GENERAL PROGRESS NOTE
Assessment/Plan


Problem List:  


(1) Abscess


ICD Codes:  L02.91 - Cutaneous abscess, unspecified


SNOMED:  467887322


(2) Hidradenitis suppurativa


ICD Codes:  L73.2 - Hidradenitis suppurativa


SNOMED:  65143552


(3) Paroxysmal atrial tachycardia


ICD Codes:  I47.1 - Supraventricular tachycardia


SNOMED:  483322882


(4) HTN (hypertension)


ICD Codes:  I10 - Essential (primary) hypertension


SNOMED:  86990207


(5) GERD (gastroesophageal reflux disease)


ICD Codes:  K21.9 - Gastro-esophageal reflux disease without esophagitis


SNOMED:  566904437


(6) Acute blood loss anemia


ICD Codes:  D62 - Acute posthemorrhagic anemia


SNOMED:  329415039


Status:  stable


Assessment/Plan


Appreciate plastic surgery rec's


s/p right axillary and lower abdominal wall debridement with flap elevation on 

11/15/17


s/p closure of right axillary and abdominal wounds on 11/17/17


Cont IV abx


F/u cultures


Post operative recommendations include:


- encourage mobilization/ambulation


- encourage incentive spirometry to optimize pulmonary hygiene


- DVT/GI prophylaxis as appropriate--SCDs, HSQ


- pain control, supportive care, bowel regimen


Appreciate cardiology rec's


TTE unremarkable


Cont home MTP


Cont home PPI


Mucinex PRN





DVT ppx: SCDs, HSQ as pt is now moderate risk postop





A total of 31min of extra time was spent in addition to normal encounter time 

for care/coordination and counseling. D/w pt/family, RN, SW/CM, surgery, 

cardiology regarding mgmt and dispo





Subjective


Date patient seen:  Nov 19, 2017


Time patient seen:  12:23


ROS Limited/Unobtainable:  No


Constitutional:  Reports: no symptoms


HEENT:  Reports: no symptoms


Cardiovascular:  Reports: no symptoms


Respiratory:  Reports: no symptoms


Gastrointestinal/Abdominal:  Reports: no symptoms


Genitourinary:  Reports: no symptoms


Neurologic/Psychiatric:  Reports: no symptoms


Endocrine:  Reports: no symptoms


Hematologic/Lymphatic:  Reports: no symptoms


Allergies:  


Coded Allergies:  


     CELECOXIB (Verified  Allergy, Unknown, 11/14/17)


All Systems:  reviewed and negative except above


Subjective


No acute o/n events


s/p right axillary and lower abdominal wall debridement with flap elevation POD#

4


s/p closure of right axillary and abdominal wounds POD#2





Pt doing well. Nausea improved, no emesis. Pain controlled, denies f/c, d/c, 

chest pain, palpitations


No BM yet


Ambulating a few steps yesterday





Objective





Last 24 Hour Vital Signs








  Date Time  Temp Pulse Resp B/P (MAP) Pulse Ox O2 Delivery O2 Flow Rate FiO2


 


11/19/17 10:24      Room Air  


 


11/19/17 10:24     95 Room Air  


 


11/19/17 09:31  75  134/75    


 


11/19/17 08:00   18     


 


11/19/17 08:00 98.4 75 18 134/75 95 Room Air  


 


11/19/17 05:00 98.4       


 


11/19/17 04:00 99.1 64 18 130/65 95 Room Air  


 


11/19/17 04:00   18     


 


11/19/17 00:40 98.9 57 18 140/73 96 Room Air  


 


11/19/17 00:00   18     


 


11/18/17 21:26  71  118/65    


 


11/18/17 20:23 99.4 71 18 118/65 95 Room Air  


 


11/18/17 20:05   18     


 


11/18/17 16:00 98.7 61 20 130/68 98 Room Air  


 


11/18/17 16:00   18     


 


11/18/17 13:00   18     

















Intake and Output  


 


 11/19/17 11/20/17





 19:00 07:00


 


Output Total 1000 ml 


 


Balance -1000 ml 


 


  


 


Output Urine Total 1000 ml 








Height (Feet):  5


Height (Inches):  9.00


Weight (Pounds):  280


Objective


General: alert, cooperative, no distress, appears stated age


Head: normocephalic, without obvious abnormality, atraumatic


Eyes: conjunctivae/corneas clear. PERRL, EOM's intact


Throat: lips, mucosa, and tongue normal. MMM


Neck: supple, symmetrical, trachea midline, and no JVD


Lungs: clear to auscultation bilaterally


Heart: regular rate and rhythm, S1, S2 normal, no murmur, click, rub or gallop


Abdomen: soft, non-tender, non-distended, bowel sounds normal; no masses or 

organomegaly


Extremities: extremities normal, atraumatic, no cyanosis or edema


Pulses: 2+ and symmetric


Skin: Dressing c/d/i


Neurologic: grossly normal, no focal deficits











Phil Marie M.D. Nov 19, 2017 12:23

## 2017-11-20 VITALS — DIASTOLIC BLOOD PRESSURE: 60 MMHG | SYSTOLIC BLOOD PRESSURE: 124 MMHG

## 2017-11-20 VITALS — SYSTOLIC BLOOD PRESSURE: 111 MMHG | DIASTOLIC BLOOD PRESSURE: 59 MMHG

## 2017-11-20 VITALS — SYSTOLIC BLOOD PRESSURE: 111 MMHG | DIASTOLIC BLOOD PRESSURE: 61 MMHG

## 2017-11-20 VITALS — DIASTOLIC BLOOD PRESSURE: 70 MMHG | SYSTOLIC BLOOD PRESSURE: 132 MMHG

## 2017-11-20 VITALS — SYSTOLIC BLOOD PRESSURE: 63 MMHG

## 2017-11-20 VITALS — DIASTOLIC BLOOD PRESSURE: 63 MMHG | SYSTOLIC BLOOD PRESSURE: 105 MMHG

## 2017-11-20 VITALS — DIASTOLIC BLOOD PRESSURE: 63 MMHG | SYSTOLIC BLOOD PRESSURE: 125 MMHG

## 2017-11-20 LAB
ANION GAP SERPL CALC-SCNC: 7 MMOL/L (ref 5–15)
APPEARANCE UR: (no result)
BACTERIA #/AREA URNS HPF: (no result) /HPF
BASOPHILS NFR BLD AUTO: 0.7 % (ref 0–2)
CALCIUM SERPL-MCNC: 8.9 MG/DL (ref 8.5–10.1)
CHLORIDE SERPL-SCNC: 100 MMOL/L (ref 98–107)
CO2 SERPL-SCNC: 29 MMOL/L (ref 21–32)
CREAT SERPL-MCNC: 0.8 MG/DL (ref 0.55–1.3)
EOSINOPHIL NFR BLD AUTO: 1.8 % (ref 0–3)
ERYTHROCYTE [DISTWIDTH] IN BLOOD BY AUTOMATED COUNT: 12.8 % (ref 11.6–14.8)
GFR SERPLBLD BASED ON 1.73 SQ M-ARVRAT: > 60 ML/MIN (ref 60–?)
KETONES UR QL STRIP: NEGATIVE
LEUKOCYTE ESTERASE UR QL STRIP: (no result)
LYMPHOCYTES NFR BLD AUTO: 11.1 % (ref 20–45)
MCH RBC QN AUTO: 28.9 PG (ref 27–31)
MCHC RBC AUTO-ENTMCNC: 33.3 G/DL (ref 32–36)
MCV RBC AUTO: 87 FL (ref 80–99)
MONOCYTES NFR BLD AUTO: 8.7 % (ref 1–10)
NEUTROPHILS NFR BLD AUTO: 77.7 % (ref 45–75)
NITRITE UR QL STRIP: POSITIVE
PH UR STRIP: 6.5 [PH] (ref 4.5–8)
PLATELET # BLD: 378 K/UL (ref 150–450)
PMV BLD AUTO: 5.7 FL (ref 6.5–10.1)
POTASSIUM SERPL-SCNC: 4.1 MMOL/L (ref 3.5–5.1)
PROT UR QL STRIP: NEGATIVE
RBC # BLD AUTO: 4.29 M/UL (ref 4.2–5.4)
RBC #/AREA URNS HPF: (no result) /HPF (ref 0–2)
SODIUM SERPL-SCNC: 136 MMOL/L (ref 136–145)
SP GR UR STRIP: 1.01 (ref 1–1.03)
SQUAMOUS #/AREA URNS LPF: (no result) /LPF
UROBILINOGEN UR-MCNC: NORMAL MG/DL (ref 0–1)
WBC # BLD AUTO: 15 K/UL (ref 4.8–10.8)
WBC #/AREA URNS HPF: (no result) /HPF (ref 0–2)

## 2017-11-20 RX ADMIN — Medication SCH MLS/HR: at 21:16

## 2017-11-20 RX ADMIN — METOPROLOL TARTRATE SCH MG: 50 TABLET, FILM COATED ORAL at 08:45

## 2017-11-20 RX ADMIN — HEPARIN SODIUM SCH UNITS: 5000 INJECTION INTRAVENOUS; SUBCUTANEOUS at 20:47

## 2017-11-20 RX ADMIN — Medication PRN MLS/HR: at 15:49

## 2017-11-20 RX ADMIN — ALUMINUM HYDROXIDE, MAGNESIUM HYDROXIDE, AND SIMETHICONE PRN ML: 200; 200; 20 SUSPENSION ORAL at 08:45

## 2017-11-20 RX ADMIN — SODIUM CHLORIDE SCH MLS/HR: 0.9 INJECTION INTRAVENOUS at 18:00

## 2017-11-20 RX ADMIN — BACITRACIN ZINC NEOMYCIN SULFATE POLYMYXIN B SULFATE SCH APPLIC: 400; 3.5; 5 OINTMENT TOPICAL at 10:22

## 2017-11-20 RX ADMIN — SODIUM CHLORIDE SCH MLS/HR: 0.9 INJECTION INTRAVENOUS at 10:22

## 2017-11-20 RX ADMIN — SODIUM CHLORIDE SCH MLS/HR: 0.9 INJECTION INTRAVENOUS at 02:18

## 2017-11-20 RX ADMIN — METOPROLOL TARTRATE SCH MG: 50 TABLET, FILM COATED ORAL at 20:42

## 2017-11-20 RX ADMIN — DOCUSATE SODIUM SCH MG: 100 CAPSULE, LIQUID FILLED ORAL at 20:40

## 2017-11-20 RX ADMIN — DOCUSATE SODIUM SCH MG: 100 CAPSULE, LIQUID FILLED ORAL at 08:45

## 2017-11-20 RX ADMIN — HEPARIN SODIUM SCH UNITS: 5000 INJECTION INTRAVENOUS; SUBCUTANEOUS at 08:51

## 2017-11-20 RX ADMIN — POLYETHYLENE GLYCOL 3350 SCH GM: 17 POWDER, FOR SOLUTION ORAL at 08:45

## 2017-11-20 RX ADMIN — BACITRACIN ZINC NEOMYCIN SULFATE POLYMYXIN B SULFATE SCH APPLIC: 400; 3.5; 5 OINTMENT TOPICAL at 18:26

## 2017-11-20 NOTE — INFECTIOUS DISEASES PROG NOTE
Assessment/Plan


Assessment/Plan








Full consult dictated:





A)


   1) fevers, leukocytosis, possible uti wiht significantly + ua 


   2) right axilla and lower abdominal wound infection with abscess/cellulitis


   3) s/p debridement and closure, prior wound culture with mssa and cns


   4) hidradenitis suppurativa, htn, svt/tachycardia hx, fibromyalgia, gerd, 

anemia


   5) allergy - celecoxib, fh-nc, sh-negative, mar noted, notes and records 

noted


   6) d/w RN





P)


   1) change to vancomycin and rocephin


   2) check bc, urine culture, labs and chest x-ray


   3) continue tx per Dr. Villarreal, Dr. Bustos and consultants


   4) wound care per surgery 


   5) d/w Dr. Villarreal


   6) orders entered and noted


   7) thank you





Subjective


Allergies:  


Coded Allergies:  


     CELECOXIB (Verified  Allergy, Unknown, 11/14/17)





Objective


Vital Signs





Last 24 Hour Vital Signs








  Date Time  Temp Pulse Resp B/P (MAP) Pulse Ox O2 Delivery O2 Flow Rate FiO2


 


11/20/17 16:00   20     


 


11/20/17 16:00 98.8 76 17 111/61 93 Room Air  


 


11/20/17 13:55  74 18 111/59 94 Room Air  


 


11/20/17 12:00   22     


 


11/20/17 12:00 97.1 80 16 63/ 96 Nasal Cannula 3.0 


 


11/20/17 08:45  71  105/63    


 


11/20/17 08:00 99.0 71 17 105/63 95 Room Air  


 


11/20/17 08:00   20     


 


11/20/17 06:17 99.7       


 


11/20/17 04:05   18     


 


11/20/17 04:00 101.7 83 18 125/63 95 Nasal Cannula 2.0 


 


11/20/17 01:22 99.3       


 


11/20/17 00:27 101.4 90 18 132/70 95 Nasal Cannula 2.0 


 


11/20/17 00:25   18     


 


11/19/17 23:40 99.9       


 


11/19/17 21:02  96  142/72    


 


11/19/17 20:00 102.6 100 16 143/72 95 Nasal Cannula 2.0 


 


11/19/17 20:00   18     


 


11/19/17 19:10      Nasal Cannula 3.0 32


 


11/19/17 19:10     96 Nasal Cannula 3.0 32








Height (Feet):  5


Height (Inches):  9.00


Weight (Pounds):  280





Laboratory Tests








Test


  11/20/17


04:20


 


White Blood Count


  15.0 K/UL


(4.8-10.8)  H


 


Red Blood Count


  4.29 M/UL


(4.20-5.40)


 


Hemoglobin


  12.4 G/DL


(12.0-16.0)


 


Hematocrit


  37.3 %


(37.0-47.0)


 


Mean Corpuscular Volume 87 FL (80-99)  


 


Mean Corpuscular Hemoglobin


  28.9 PG


(27.0-31.0)


 


Mean Corpuscular Hemoglobin


Concent 33.3 G/DL


(32.0-36.0)


 


Red Cell Distribution Width


  12.8 %


(11.6-14.8)


 


Platelet Count


  378 K/UL


(150-450)


 


Mean Platelet Volume


  5.7 FL


(6.5-10.1)  L


 


Neutrophils (%) (Auto)


  77.7 %


(45.0-75.0)  H


 


Lymphocytes (%) (Auto)


  11.1 %


(20.0-45.0)  L


 


Monocytes (%) (Auto)


  8.7 %


(1.0-10.0)


 


Eosinophils (%) (Auto)


  1.8 %


(0.0-3.0)


 


Basophils (%) (Auto)


  0.7 %


(0.0-2.0)


 


Sodium Level


  136 MMOL/L


(136-145)


 


Potassium Level


  4.1 MMOL/L


(3.5-5.1)


 


Chloride Level


  100 MMOL/L


()


 


Carbon Dioxide Level


  29 MMOL/L


(21-32)


 


Anion Gap


  7 mmol/L


(5-15)


 


Blood Urea Nitrogen


  10 mg/dL


(7-18)


 


Creatinine


  0.8 MG/DL


(0.55-1.30)


 


Estimat Glomerular Filtration


Rate > 60 mL/min


(>60)


 


Glucose Level


  118 MG/DL


()  H


 


Calcium Level


  8.9 MG/DL


(8.5-10.1)











Current Medications








 Medications


  (Trade)  Dose


 Ordered  Sig/Hilda


 Route


 PRN Reason  Start Time


 Stop Time Status Last Admin


Dose Admin


 


 Acetaminophen


  (Tylenol)  650 mg  Q4H  PRN


 ORAL


 FEVER  11/17/17 09:00


 12/17/17 08:59  11/20/17 05:18


 


 


 Al Hydroxide/Mg


 Hydroxide


  (Mylanta II)  30 ml  Q6H  PRN


 ORAL


 dyspepsia  11/14/17 13:30


 12/14/17 13:29  11/20/17 08:45


 


 


 Bisacodyl


  (Dulcolax)  10 mg  DAILYPRN  PRN


 RECTAL


 Constipation  11/18/17 13:30


 12/18/17 13:29   


 


 


 Cefazolin Sodium


 1 gm/Dextrose  55 ml @ 


 110 mls/hr  Q8H


 IVPB


   11/14/17 18:00


 11/21/17 17:59  11/20/17 10:22


 


 


 Cetirizine HCl


  (ZyrTEC)  10 mg  QPM


 ORAL


   11/14/17 16:30


 12/14/17 16:29  11/20/17 15:42


 


 


 Dextrose


  (Dextrose 50%)    STAT  PRN


 IV


 Hypoglycemia  11/14/17 13:30


 12/14/17 13:29   


 


 


 Diphenhydramine


 HCl


  (Benadryl)  12.5 mg  Q6H  PRN


 IVP


 Itching/Pruritis  11/15/17 09:00


 12/15/17 08:59  11/18/17 23:05


 


 


 Diphenhydramine


 HCl


  (Benadryl)  25 mg  Q6H  PRN


 ORAL


 Itching/Pruritis  11/14/17 13:30


 12/14/17 13:29  11/19/17 17:00


 


 


 Docusate Sodium


  (Colace)  100 mg  EVERY 12  HOURS


 ORAL


   11/14/17 21:00


 12/14/17 20:59  11/20/17 08:45


 


 


 Guaifenesin


  (Mucinex ER)  600 mg  TWICE A DAY  PRN


 ORAL


 cough, congestion  11/18/17 13:30


 12/18/17 13:29  11/18/17 17:19


 


 


 Heparin Sodium


  (Porcine)


  (Heparin 5000


 units/ml)  5,000 units  EVERY 12  HOURS


 SUBQ


   11/17/17 21:00


 12/17/17 20:59  11/20/17 08:51


 


 


 Hydralazine HCl


  (Apresoline)  10 mg  Q6H  PRN


 ORAL


 SBP > 160  11/19/17 16:45


 12/19/17 16:44  11/19/17 17:01


 


 


 Hydromorphone HCl  30 ml @ 0


 mls/hr  Q24H  PRN


 IV


 For Pain  11/19/17 12:15


 11/21/17 12:14  11/20/17 15:49


 


 


 Hydromorphone HCl


  (Dilaudid)  2 mg  Q3H  PRN


 SUBQ


 Severe Pain (Pain Scale 7-10)  11/19/17 09:15


 11/21/17 09:14   


 


 


 Hydromorphone HCl


  (Dilaudid)  2 mg  Q4H  PRN


 IVP


 Moderate Pain (Pain Scale 4-6)  11/19/17 12:15


 11/21/17 12:14   


 


 


 Metoclopramide HCl


  (Reglan)  10 mg  Q6H  PRN


 IVP


 Nausea & Vomiting  11/17/17 09:00


 12/17/17 08:59  11/20/17 12:50


 


 


 Metoprolol


 Tartrate


  (Lopressor)  50 mg  Q12HR


 ORAL


   11/16/17 21:00


 12/16/17 20:59  11/20/17 08:45


 


 


 Miscellaneous


 Medication


  (PCA Rate Change)  1 ea  DAILY  PRN


 MISC


 rate change  11/19/17 09:00


 11/21/17 08:59   


 


 


 Miscellaneous


 Medication


  (PCA shift


 volume)  1 ea  Q12HR@0700,1900


 MISC


   11/19/17 19:00


 11/21/17 18:59  11/20/17 07:00


 


 


 Naloxone HCl


  (Narcan)  0.1 mg  STAT  PRN


 IV


 RR<10,SBP<90,OR NON-RESPONSIVE  11/19/17 09:00


 12/19/17 08:59   


 


 


 Neomycin/


 Polymyxin/


 Bacitracin


  (Neosporin Oint


 15gm)  1 applic  BID


 TOPIC


   11/20/17 11:00


 12/20/17 10:59  11/20/17 10:22


 


 


 Ondansetron HCl


  (Zofran)  4 mg  Q6H  PRN


 IVP


 Nausea & Vomiting  11/17/17 09:00


 12/17/17 08:59  11/20/17 11:55


 


 


 Pantoprazole


  (Protonix)  40 mg  BEFORE  DINNER


 ORAL


   11/14/17 16:30


 12/15/17 08:59  11/20/17 15:42


 


 


 Polyethylene


 Glycol


  (Miralax)  17 gm  DAILY


 ORAL


   11/19/17 09:00


 12/19/17 08:59  11/20/17 08:45


 


 


 Sodium Chloride  1,000 ml @ 


 75 mls/hr  [id number]


 IV


   11/20/17 15:45


 12/20/17 15:44  11/20/17 15:42


 


 


 Zolpidem Tartrate


  (Ambien)  5 mg  HSPRN  PRN


 ORAL


 Insomnia  11/14/17 13:30


 11/21/17 13:29   


 

















SHADE JEAN Nov 20, 2017 18:36

## 2017-11-20 NOTE — GENERAL PROGRESS NOTE
Assessment/Plan


Problem List:  


(1) Sepsis


ICD Codes:  A41.9 - Sepsis, unspecified organism


SNOMED:  35742519


(2) UTI (urinary tract infection)


ICD Codes:  N39.0 - Urinary tract infection, site not specified


SNOMED:  79354930


(3) Abscess


ICD Codes:  L02.91 - Cutaneous abscess, unspecified


SNOMED:  403886491


(4) Hidradenitis suppurativa


ICD Codes:  L73.2 - Hidradenitis suppurativa


SNOMED:  02756469


(5) Paroxysmal atrial tachycardia


ICD Codes:  I47.1 - Supraventricular tachycardia


SNOMED:  571045464


(6) HTN (hypertension)


ICD Codes:  I10 - Essential (primary) hypertension


SNOMED:  15271436


(7) GERD (gastroesophageal reflux disease)


ICD Codes:  K21.9 - Gastro-esophageal reflux disease without esophagitis


SNOMED:  091684067


(8) Acute blood loss anemia


ICD Codes:  D62 - Acute posthemorrhagic anemia


SNOMED:  533771882


Status:  stable


Assessment/Plan


Appreciate plastic surgery rec's


s/p right axillary and lower abdominal wall debridement with flap elevation on 

11/15/17


s/p closure of right axillary and abdominal wounds on 11/17/17


Cont IV abx--expanded to vanco and ceftriaxone per ID


U/A c/w UTI


F/u repeat blood culture, CXR


ID consulted, appreciate rec's


Post operative recommendations include:


- encourage mobilization/ambulation


- encourage incentive spirometry to optimize pulmonary hygiene


- DVT/GI prophylaxis as appropriate--SCDs, HSQ


- pain control, supportive care, bowel regimen


Appreciate cardiology rec's


TTE unremarkable


Cont home MTP


Cont home PPI


Mucinex PRN





DVT ppx: SCDs, HSQ as pt is now moderate risk postop





A total of 31min of extra time was spent in addition to normal encounter time 

for care/coordination and counseling. D/w pt/family, RN, SW/CM, surgery, 

cardiology regarding mgmt and dispo





Subjective


Date patient seen:  Nov 20, 2017


Time patient seen:  13:00


Constitutional:  Reports: fever, weakness


HEENT:  Reports: no symptoms


Cardiovascular:  Reports: no symptoms


Respiratory:  Reports: no symptoms


Gastrointestinal/Abdominal:  Reports: no symptoms


Genitourinary:  Reports: no symptoms


Neurologic/Psychiatric:  Reports: no symptoms


Endocrine:  Reports: no symptoms


Hematologic/Lymphatic:  Reports: no symptoms


Allergies:  


Coded Allergies:  


     CELECOXIB (Verified  Allergy, Unknown, 11/14/17)


All Systems:  reviewed and negative except above


Subjective


Tmax 102 overnight


s/p right axillary and lower abdominal wall debridement with flap elevation POD#

5


s/p closure of right axillary and abdominal wounds POD#3


WBC rising to 15K


Bueno removed this AM


Pt feels fatigued. Pain controlled, denies f/c, d/c, chest pain, palpitations


No BM yet


Ambulating





Objective





Last 24 Hour Vital Signs








  Date Time  Temp Pulse Resp B/P (MAP) Pulse Ox O2 Delivery O2 Flow Rate FiO2


 


11/20/17 16:19 98.8       


 


11/20/17 16:00   20     


 


11/20/17 16:00 98.8 76 17 111/61 93 Room Air  


 


11/20/17 13:55  74 18 111/59 94 Room Air  


 


11/20/17 12:00   22     


 


11/20/17 12:00 97.1 80 16 63/ 96 Nasal Cannula 3.0 


 


11/20/17 08:45  71  105/63    


 


11/20/17 08:00 99.0 71 17 105/63 95 Room Air  


 


11/20/17 08:00   20     


 


11/20/17 06:17 99.7       


 


11/20/17 04:05   18     


 


11/20/17 04:00 101.7 83 18 125/63 95 Nasal Cannula 2.0 


 


11/20/17 01:22 99.3       


 


11/20/17 00:27 101.4 90 18 132/70 95 Nasal Cannula 2.0 


 


11/20/17 00:25   18     


 


11/19/17 23:40 99.9       


 


11/19/17 21:02  96  142/72    


 


11/19/17 20:00 102.6 100 16 143/72 95 Nasal Cannula 2.0 


 


11/19/17 20:00   18     

















Intake and Output  


 


 11/20/17 11/21/17





 19:00 07:00


 


Intake Total 400 ml 300 ml


 


Output Total 1400 ml 35 ml


 


Balance -1000 ml 265 ml


 


  


 


Intake Oral 250 ml 300 ml


 


IV Total 150 ml 


 


Output Urine Total 1400 ml 


 


Drainage Total  35 ml


 


# Voids 1 








Laboratory Tests


11/20/17 04:20: 


White Blood Count 15.0H, Red Blood Count 4.29, Hemoglobin 12.4, Hematocrit 37.3

, Mean Corpuscular Volume 87, Mean Corpuscular Hemoglobin 28.9, Mean 

Corpuscular Hemoglobin Concent 33.3, Red Cell Distribution Width 12.8, Platelet 

Count 378, Mean Platelet Volume 5.7L, Neutrophils (%) (Auto) 77.7H, Lymphocytes 

(%) (Auto) 11.1L, Monocytes (%) (Auto) 8.7, Eosinophils (%) (Auto) 1.8, 

Basophils (%) (Auto) 0.7, Sodium Level 136, Potassium Level 4.1, Chloride Level 

100, Carbon Dioxide Level 29, Anion Gap 7, Blood Urea Nitrogen 10, Creatinine 

0.8, Estimat Glomerular Filtration Rate > 60, Glucose Level 118H, Calcium Level 

8.9


11/20/17 16:55: 


Urine Color Pale yellow, Urine Appearance Cloudy, Urine pH 6.5, Urine Specific 

Gravity 1.010, Urine Protein Negative, Urine Glucose (UA) Negative, Urine 

Ketones Negative, Urine Occult Blood 5+H, Urine Nitrite PositiveH, Urine 

Bilirubin Negative, Urine Urobilinogen Normal, Urine Leukocyte Esterase 2+H, 

Urine RBC 5-10H, Urine WBC 15-20H, Urine Squamous Epithelial Cells ModerateH, 

Urine Bacteria ModerateH


Height (Feet):  5


Height (Inches):  9.00


Weight (Pounds):  280


Objective


General: alert, cooperative, no distress, appears stated age


Head: normocephalic, without obvious abnormality, atraumatic


Eyes: conjunctivae/corneas clear. PERRL, EOM's intact


Throat: lips, mucosa, and tongue normal. MMM


Neck: supple, symmetrical, trachea midline, and no JVD


Lungs: clear to auscultation bilaterally


Heart: regular rate and rhythm, S1, S2 normal, no murmur, click, rub or gallop


Abdomen: soft, non-tender, non-distended, bowel sounds normal; no masses or 

organomegaly


Extremities: extremities normal, atraumatic, no cyanosis or edema


Pulses: 2+ and symmetric


Skin: Dressing c/d/i


Neurologic: grossly normal, no focal deficits











Phil Marie M.D. Nov 20, 2017 19:55

## 2017-11-20 NOTE — CARDIAC ELECTROPHYSIOLOGY PN
Assessment/Plan


Assessment/Plan


1. History of supraventricular tachycardia.  No chest pain or shortness of 

breath.   Continue Lopressor  50 bid.  


2. Hypertension, On Lopressor 50 bid


3. Transient episode of shortness of breath and cough that was completely 

resolved.    EKG is completely normal.     


echocardiogram was normal. 


4. Hydradenitis of the Righ arm pit and lowed abdomen, s/p surgery twice. Doing 

well. Still has the drains.





BRAIN RN





Subjective


Subjective


Comfortable in NAD. No recurrence of  palpitation.Still has the drains.





Objective





Last 24 Hour Vital Signs








  Date Time  Temp Pulse Resp B/P (MAP) Pulse Ox O2 Delivery O2 Flow Rate FiO2


 


11/20/17 12:00   22     


 


11/20/17 12:00 97.1 80 16 63/ 96 Nasal Cannula 3.0 


 


11/20/17 08:45  71  105/63    


 


11/20/17 08:00 99.0 71 17 105/63 95 Room Air  


 


11/20/17 08:00   20     


 


11/20/17 06:17 99.7       


 


11/20/17 04:05   18     


 


11/20/17 04:00 101.7 83 18 125/63 95 Nasal Cannula 2.0 


 


11/20/17 01:22 99.3       


 


11/20/17 00:27 101.4 90 18 132/70 95 Nasal Cannula 2.0 


 


11/20/17 00:25   18     


 


11/19/17 23:40 99.9       


 


11/19/17 21:02  96  142/72    


 


11/19/17 20:00 102.6 100 16 143/72 95 Nasal Cannula 2.0 


 


11/19/17 20:00   18     


 


11/19/17 19:10      Nasal Cannula 3.0 32


 


11/19/17 19:10     96 Nasal Cannula 3.0 32


 


11/19/17 17:01    174/89    


 


11/19/17 16:00 102.6 85 18 174/89 97 Room Air  


 


11/19/17 16:00   18     


 


11/19/17 14:06 98.4       

















Intake and Output  


 


 11/20/17 11/21/17





 19:00 07:00


 


Intake Total 250 ml 


 


Output Total 400 ml 


 


Balance -150 ml 


 


  


 


Intake Oral 250 ml 


 


Output Urine Total 400 ml 











Laboratory Tests








Test


  11/20/17


04:20


 


White Blood Count


  15.0 K/UL


(4.8-10.8)  H


 


Red Blood Count


  4.29 M/UL


(4.20-5.40)


 


Hemoglobin


  12.4 G/DL


(12.0-16.0)


 


Hematocrit


  37.3 %


(37.0-47.0)


 


Mean Corpuscular Volume 87 FL (80-99)  


 


Mean Corpuscular Hemoglobin


  28.9 PG


(27.0-31.0)


 


Mean Corpuscular Hemoglobin


Concent 33.3 G/DL


(32.0-36.0)


 


Red Cell Distribution Width


  12.8 %


(11.6-14.8)


 


Platelet Count


  378 K/UL


(150-450)


 


Mean Platelet Volume


  5.7 FL


(6.5-10.1)  L


 


Neutrophils (%) (Auto)


  77.7 %


(45.0-75.0)  H


 


Lymphocytes (%) (Auto)


  11.1 %


(20.0-45.0)  L


 


Monocytes (%) (Auto)


  8.7 %


(1.0-10.0)


 


Eosinophils (%) (Auto)


  1.8 %


(0.0-3.0)


 


Basophils (%) (Auto)


  0.7 %


(0.0-2.0)


 


Sodium Level


  136 MMOL/L


(136-145)


 


Potassium Level


  4.1 MMOL/L


(3.5-5.1)


 


Chloride Level


  100 MMOL/L


()


 


Carbon Dioxide Level


  29 MMOL/L


(21-32)


 


Anion Gap


  7 mmol/L


(5-15)


 


Blood Urea Nitrogen


  10 mg/dL


(7-18)


 


Creatinine


  0.8 MG/DL


(0.55-1.30)


 


Estimat Glomerular Filtration


Rate > 60 mL/min


(>60)


 


Glucose Level


  118 MG/DL


()  H


 


Calcium Level


  8.9 MG/DL


(8.5-10.1)








Objective


HEAD AND NECK:  No JVD.


LUNGS:  Clear.


CARDIOVASCULAR:  Regular S1 and S2 with no gallop or murmur.


ABDOMEN:  Soft and nontender.  Lower abdomen  drains present 


EXTREMITIES:  No pitting edema.Right axilla s/p Surgery with drain











BAKARI RETANA Nov 20, 2017 13:54

## 2017-11-21 VITALS — DIASTOLIC BLOOD PRESSURE: 61 MMHG | SYSTOLIC BLOOD PRESSURE: 118 MMHG

## 2017-11-21 VITALS — SYSTOLIC BLOOD PRESSURE: 130 MMHG | DIASTOLIC BLOOD PRESSURE: 64 MMHG

## 2017-11-21 VITALS — SYSTOLIC BLOOD PRESSURE: 121 MMHG | DIASTOLIC BLOOD PRESSURE: 73 MMHG

## 2017-11-21 VITALS — DIASTOLIC BLOOD PRESSURE: 65 MMHG | SYSTOLIC BLOOD PRESSURE: 127 MMHG

## 2017-11-21 VITALS — DIASTOLIC BLOOD PRESSURE: 63 MMHG | SYSTOLIC BLOOD PRESSURE: 110 MMHG

## 2017-11-21 VITALS — SYSTOLIC BLOOD PRESSURE: 117 MMHG | DIASTOLIC BLOOD PRESSURE: 61 MMHG

## 2017-11-21 LAB
BASOPHILS NFR BLD MANUAL: 0 % (ref 0–2)
EOSINOPHIL NFR BLD MANUAL: 1 % (ref 0–3)
ERYTHROCYTE [DISTWIDTH] IN BLOOD BY AUTOMATED COUNT: 13 % (ref 11.6–14.8)
MCH RBC QN AUTO: 28.8 PG (ref 27–31)
MCHC RBC AUTO-ENTMCNC: 32.8 G/DL (ref 32–36)
MCV RBC AUTO: 88 FL (ref 80–99)
NEUTS BAND NFR BLD MANUAL: 0 % (ref 0–8)
NEUTS BAND NFR BLD MANUAL: 83 % (ref 45–75)
PLAT MORPH BLD: NORMAL
PLATELET # BLD EST: ADEQUATE 10*3/UL
PLATELET # BLD: 351 K/UL (ref 150–450)
PMV BLD AUTO: 6 FL (ref 6.5–10.1)
RBC # BLD AUTO: 4.05 M/UL (ref 4.2–5.4)
RBC MORPH BLD: NORMAL
TOTAL CELLS COUNTED BLD: 100
VARIANT LYMPHS NFR BLD MANUAL: 14 % (ref 20–45)
WBC # BLD AUTO: 18 K/UL (ref 4.8–10.8)

## 2017-11-21 RX ADMIN — Medication SCH MLS/HR: at 20:35

## 2017-11-21 RX ADMIN — Medication SCH MLS/HR: at 08:37

## 2017-11-21 RX ADMIN — BACITRACIN ZINC NEOMYCIN SULFATE POLYMYXIN B SULFATE SCH APPLIC: 400; 3.5; 5 OINTMENT TOPICAL at 18:06

## 2017-11-21 RX ADMIN — DOCUSATE SODIUM SCH MG: 100 CAPSULE, LIQUID FILLED ORAL at 20:58

## 2017-11-21 RX ADMIN — BACITRACIN ZINC NEOMYCIN SULFATE POLYMYXIN B SULFATE SCH APPLIC: 400; 3.5; 5 OINTMENT TOPICAL at 09:32

## 2017-11-21 RX ADMIN — HEPARIN SODIUM SCH UNITS: 5000 INJECTION INTRAVENOUS; SUBCUTANEOUS at 08:44

## 2017-11-21 RX ADMIN — Medication SCH MG: at 16:26

## 2017-11-21 RX ADMIN — DEXTROSE MONOHYDRATE SCH MLS/HR: 50 INJECTION, SOLUTION INTRAVENOUS at 19:00

## 2017-11-21 RX ADMIN — DEXTROSE MONOHYDRATE SCH MLS/HR: 50 INJECTION, SOLUTION INTRAVENOUS at 11:51

## 2017-11-21 RX ADMIN — METOPROLOL TARTRATE SCH MG: 50 TABLET, FILM COATED ORAL at 08:37

## 2017-11-21 RX ADMIN — DOCUSATE SODIUM SCH MG: 100 CAPSULE, LIQUID FILLED ORAL at 08:37

## 2017-11-21 RX ADMIN — POLYETHYLENE GLYCOL 3350 SCH GM: 17 POWDER, FOR SOLUTION ORAL at 08:37

## 2017-11-21 RX ADMIN — METOPROLOL TARTRATE SCH MG: 50 TABLET, FILM COATED ORAL at 20:58

## 2017-11-21 RX ADMIN — ALUMINUM HYDROXIDE, MAGNESIUM HYDROXIDE, AND SIMETHICONE PRN ML: 200; 200; 20 SUSPENSION ORAL at 13:05

## 2017-11-21 RX ADMIN — HEPARIN SODIUM SCH UNITS: 5000 INJECTION INTRAVENOUS; SUBCUTANEOUS at 21:05

## 2017-11-21 NOTE — CARDIAC ELECTROPHYSIOLOGY PN
Assessment/Plan


Assessment/Plan


1. History of supraventricular tachycardia.  No recurrence on  Lopressor  50 

bid.  


2. Hypertension, On Lopressor 50 bid


3. Transient episode of shortness of breath and cough that was completely 

resolved.    EKG is completely normal.     


echocardiogram was normal. 


4. Hydradenitis of the Right axilla and lowed abdomen, s/p surgery. Doing well. 

Still has the drains.





DW RN and  at bedside.





Subjective


Subjective


Comfortable in NAD. No CP or  palpitation.Still has the drains. and RN 

at bedside.





Objective





Last 24 Hour Vital Signs








  Date Time  Temp Pulse Resp B/P (MAP) Pulse Ox O2 Delivery O2 Flow Rate FiO2


 


11/21/17 08:37  84  130/64    


 


11/21/17 04:00 99.3 81 17 121/73 97 Room Air  


 


11/21/17 04:00   20     


 


11/21/17 00:19 98.8 70 19 118/61 94 Room Air  


 


11/21/17 00:00   20     


 


11/20/17 22:13 98.9       


 


11/20/17 20:51 101.9 93 21 124/60 96 Room Air  


 


11/20/17 20:42  93  124/60    


 


11/20/17 20:13   20     


 


11/20/17 16:19 98.8       


 


11/20/17 16:00   20     


 


11/20/17 16:00 98.8 76 17 111/61 93 Room Air  


 


11/20/17 13:55  74 18 111/59 94 Room Air  


 


11/20/17 12:00   22     


 


11/20/17 12:00 97.1 80 16 63/ 96 Nasal Cannula 3.0 











Laboratory Tests








Test


  11/20/17


16:55 11/21/17


05:20


 


Urine Color Pale yellow   


 


Urine Appearance Cloudy   


 


Urine pH 6.5 (4.5-8.0)   


 


Urine Specific Gravity


  1.010


(1.005-1.035) 


 


 


Urine Protein


  Negative


(NEGATIVE) 


 


 


Urine Glucose (UA)


  Negative


(NEGATIVE) 


 


 


Urine Ketones


  Negative


(NEGATIVE) 


 


 


Urine Occult Blood


  5+ (NEGATIVE)


H 


 


 


Urine Nitrite


  Positive


(NEGATIVE)  H 


 


 


Urine Bilirubin


  Negative


(NEGATIVE) 


 


 


Urine Urobilinogen


  Normal MG/DL


(0.0-1.0) 


 


 


Urine Leukocyte Esterase


  2+ (NEGATIVE)


H 


 


 


Urine RBC


  5-10 /HPF (0 -


2)  H 


 


 


Urine WBC


  15-20 /HPF (0


- 2)  H 


 


 


Urine Squamous Epithelial


Cells Moderate /LPF


(NONE/OCC)  H 


 


 


Urine Bacteria


  Moderate /HPF


(NONE)  H 


 


 


White Blood Count


  


  18.0 K/UL


(4.8-10.8)  H


 


Red Blood Count


  


  4.05 M/UL


(4.20-5.40)  L


 


Hemoglobin


  


  11.7 G/DL


(12.0-16.0)  L


 


Hematocrit


  


  35.6 %


(37.0-47.0)  L


 


Mean Corpuscular Volume  88 FL (80-99)  


 


Mean Corpuscular Hemoglobin


  


  28.8 PG


(27.0-31.0)


 


Mean Corpuscular Hemoglobin


Concent 


  32.8 G/DL


(32.0-36.0)


 


Red Cell Distribution Width


  


  13.0 %


(11.6-14.8)


 


Platelet Count


  


  351 K/UL


(150-450)


 


Mean Platelet Volume


  


  6.0 FL


(6.5-10.1)  L


 


Neutrophils (%) (Auto)


  


  % (45.0-75.0)


 


 


Lymphocytes (%) (Auto)


  


  % (20.0-45.0)


 


 


Monocytes (%) (Auto)   % (1.0-10.0)  


 


Eosinophils (%) (Auto)   % (0.0-3.0)  


 


Basophils (%) (Auto)   % (0.0-2.0)  


 


Neutrophils % (Manual)  Pending  


 


Lymphocytes % (Manual)  Pending  


 


Platelet Estimate  Pending  


 


Platelet Morphology  Pending  











Microbiology








 Date/Time


Source Procedure


Growth Status


 


 


 11/20/17 16:55


Urine,Clean Catch Urine Culture - Preliminary


Gram Negative Bacillus 1 Resulted








Objective


HEAD AND NECK:  No JVD.


LUNGS:  Clear.


CARDIOVASCULAR:  Regular S1 and S2 with no gallop or murmur.


ABDOMEN:  Soft and nontender.  Lower abdomen  drains present 


EXTREMITIES:   Right axilla s/p Surgery with drain











BAKARI RETANA Nov 21, 2017 08:53

## 2017-11-21 NOTE — GENERAL PROGRESS NOTE
Progress Note


Progress Note


Pt seen and examined.





POD # 4 from wound closures.





WBC up to 18 and fevers last night.





UTI identified. Gram negative rods.





Mild cellulitis on abdomen.





Will continue Vanco and start Zosyn.








Will continue to observe.





LIZ Coleman MD Nov 21, 2017 10:19

## 2017-11-21 NOTE — CONSULTATION
DATE OF CONSULTATION:  11/20/2017



INFECTIOUS DISEASES CONSULTATION



CONSULTING PHYSICIAN:  Dang Hanson M.D.



ATTENDING PHYSICIAN:  David Montano M.D.



REFERRING PHYSICIAN:  I was asked by Dr. Villarreal to see this patient.



REASON FOR CONSULTATION:  Urinary tract infection, fevers, leukocytosis,

and history of wound infection.



CHIEF COMPLAINT:  The patient's chief complaint coming in was hidradenitis

suppurativa, infected wound.



HISTORY OF PRESENT ILLNESS:  This is a very pleasant 42-year-old female

with history of hidradenitis suppurativa, who comes in to [facility]

and was noted to have redness and drainage of I believe more from the

right axilla and lower abdomen wounds.  She has history of hidradenitis

suppurativa and surgery it looks like in the past for it.  It looks like

she was on antibiotics without improvement.  The patient was

admitted for antibiotics and debridement.  The patient is status post

right axilla and lower abdominal wound debridement and closure.  Culture

done on admission, unclear where it was from that showed

methicillin-sensitive Staphylococcus aureus and coagulase-negative Staph.

The patient now has been having fevers and infectious disease consultation

was requested.  Urinalysis was done and blood cultures were done as well

as urine culture.  I called the laboratory and urinalysis had 2+ leukocyte

esterase, moderate bacteria, 15-20 white blood cells, and positive

nitrite.  The patient likely has urinary tract infection.  Antibiotics

will be changed to vancomycin and Rocephin.  She was on Ancef which was

discontinued.  We will continue vancomycin and Rocephin to cover the

wounds and also the urinary tract infection.  Urine culture has been

ordered and is pending as well as blood cultures.  Chest x-ray has also

been ordered.  MAR was noted.  Orders were noted.  Notes were reviewed.

Case was discussed with RN.  Ultrasound of the lower extremities has also

been ordered.



PAST MEDICAL HISTORY:  The patient's past medical history includes history

of the following.  The patient has past medical history of hidradenitis

suppurativa.  She has history of hypertension, history of surgeries for

the hidradenitis in the past, history of SVT and tachycardia, history of

GERD, anemia, and fibromyalgia.



MEDICATIONS:  Upon reviewing the MAR, she is on the following medications.

She is on Neosporin.  She was on Ancef which I discontinued that and put

on vancomycin and Rocephin.  She is on Apresoline.  She is on Dilaudid.

She is on MiraLAX and Narcan.  She is on Mucinex, Dulcolax, heparin,

Zofran, Reglan, Tylenol, metoprolol, Benadryl, docusate, Zyrtec, Protonix,

zolpidem, and Mylanta.  Antibiotics, she was started on vancomycin and

Rocephin.  I discontinued the cefazolin.



ALLERGIES:  Include celecoxib.



SOCIAL HISTORY:  Negative for smoking, alcohol, or drug abuse.



FAMILY HISTORY:  Noncontributory.



REVIEW OF SYSTEMS:  CONSTITUTIONAL:  The patient has generalized weakness

and fatigue.  She had fever last night as high as 102.6.  She is certainly

having low-grade temperatures today.  No chills at this time.  HEAD AND

NECK:  No head pain or neck pain.  She has generalized fatigue.

CARDIOVASCULAR:  No chest pain or palpitations.  GASTROINTESTINAL:  No

nausea, vomiting, or diarrhea.  GENITOURINARY:  She did have a Bueno that

was removed.  PULMONARY:  No congestion, shortness of breath, or

hemoptysis.  No secretions.  SKIN:  No rash or itching.  EXTREMITIES:  No

extremity pain.  Her wound, her pain is controlled at this time.  No

swelling.  No pain.  NEUROLOGICAL:  No seizures.



PHYSICAL EXAMINATION:

VITAL SIGNS:  T-max is 102.6, currently temperature 98.8, pulse rate 76,

respirations 17, blood pressure 111/61, and saturation 93% on room air.

GENERAL:  Alert, responsive, in no acute distress.

HEAD AND NECK:  Oral exam, no thrush.  Eye exam, no icterus.

Normocephalic.  No neck stiffness.  Neck is supple.

HEART:  Regular.  No gallop or murmur.

ABDOMEN:  Soft.  Positive bowel sounds.  Nontender.

LUNGS:  Clear bilaterally.  No rhonchi or rales.  Decrease in breath

sounds.

MUSCULOSKELETAL:  There is no effusion.  Legs are without

cellulitis.

PERIPHERAL VASCULAR:  No cyanosis or gangrene.

SKIN:  No other rash noted.  Wounds are covered at this time, surgically

covered.  I discussed with nursing staff, these seemed to be intact.  No

evidence of significant drainage.  Surgery is following on the wounds.

GENITOURINARY:  She had a Bueno that was removed.

LINE SITES:  Without phlebitis.  She has peripheral IV, no phlebitis noted.

 

NEUROLOGICAL:  Intact.  Alert and oriented x3.



LABORATORY AND DIAGNOSTIC DATA:  Laboratory data are as follows.

Urinalysis is pending here in the computer; however, I called the

laboratory myself and they stated that she was nitrite positive, 2+

leukocyte esterase, 15-20 white blood cells, and moderate bacteria.  Urine

culture is pending.  Blood culture is pending.  Chest x-ray has been

ordered.  Ultrasound of the lower extremities is pending at this time.

Blood cultures previously on 11/14/2017, negative.  Wound culture grew out

methicillin-sensitive Staphylococcus aureus and coagulase-negative Staph,

it is unclear where it was from.  It is either probably the axilla or the

lower abdominal wounds where she came in with infection.  Creatinine is

normal at 0.8.  White count 15.0 and hemoglobin 12.4.



ASSESSMENT AND PLAN:

1. The patient has postoperative fevers and leukocytosis with positive

urinalysis and has positive urinary tract infection.  She is status post

right axilla and lower abdominal wound infection, it looks like abscess

and cellulitis, status post debridement and closure.  Wound culture has

grown out methicillin-sensitive Staphylococcus aureus and

coagulase-negative Staphylococcus, this was done on admission.  Continue

vancomycin and Rocephin to cover Staphylococcus aureus and also

gram-negative organisms for positive urinary tract infection.  Continue

vancomycin and Rocephin.  Check chest x-ray, urine culture, blood culture,

labs, and ultrasound of the lower extremities.  Continue antibiotics and

watch the patient clinically.

2. Hidradenitis suppurativa, status post right axilla and lower

abdominal wound infection, abscess, cellulitis, debridement and closure.

3. Hypertension.

4. Supraventricular tachycardia, tachycardia history.

5. History of fibromyalgia.

6. Gastroesophageal reflux disease.

7. Anemia.

8. Pain management.

9. Allergies to celecoxib.

10. Case discussed with the RN.

11. MAR was noted.

12. Family history noncontributory.

13. Social history negative.

14. Continue treatment per primary consultants.

15. Wound care per surgery.

16. Case was discussed with the patient and her  and also

communicated with Dr. Villarreal.









  ______________________________________________

  Dang Hanson M.D.





DR:  COLT

D:  11/20/2017 18:47

T:  11/20/2017 22:22

JOB#:  [id number]

CC:



ROMEO

## 2017-11-21 NOTE — DIAGNOSTIC IMAGING REPORT
Indication: Cough



Comparison:  11/14/17



A single view chest radiograph was obtained.



Findings:



Cardiomediastinal appearance is within normal limits for age.  Pulmonary vascularity

is appropriate. The diaphragmatic contour is smooth and costophrenic angles are

sharp. No pleural effusions are identified. The bones are unremarkable.



Impression: No acute findings

## 2017-11-21 NOTE — INFECTIOUS DISEASES PROG NOTE
Assessment/Plan


Assessment/Plan








A)


   1) gram neg uti, fevers, leukocytosis, chest x-ray negative 


   2) right axilla and lower abdominal wound infection with abscess/cellulitis


   3) s/p debridement and closure, prior wound culture with mssa and cns


   4) hidradenitis suppurativa, htn, svt/tachycardia hx, fibromyalgia, gerd, 

anemia


   5) allergy - celecoxib, fh-nc, sh-negative, mar noted, notes and records 

noted


   6) d/w RN





P)


   1) change to vancomycin and zosyn


   2) check bc, urine culture and labs


   3) continue tx per Dr. Villarreal, Dr. Bustos and consultants


   4) wound care per surgery 


   5) d/w Dr. Villarreal


   6) orders entered and noted





Subjective


Constitutional:  Reports: fatigue, Denies: fever


HEENT:  Denies: congestion


Respiratory:  Denies: shortness of breath


Cardiovascular:  Denies: chest pain


Gastrointestinal/Abdominal:  Denies: nausea, vomiting, diarrhea


Genitourinary:  Reports: other - no jenkins


Neurologic:  Denies: headache


Psychiatric:  Denies: depression


Skin:  Denies: rash


Hematologic:  Denies: bleeding


Musculoskeletal:  Denies: pain


Allergies:  


Coded Allergies:  


     CELECOXIB (Verified  Allergy, Unknown, 11/14/17)





Objective


Vital Signs





Last 24 Hour Vital Signs








  Date Time  Temp Pulse Resp B/P (MAP) Pulse Ox O2 Delivery O2 Flow Rate FiO2


 


11/21/17 20:00   20     


 


11/21/17 16:00 97.5 80 17 117/61 97 Room Air  


 


11/21/17 16:00   20     


 


11/21/17 13:00 98.5 84 17 110/63 95 Room Air  


 


11/21/17 12:00   20     


 


11/21/17 08:37  84  130/64    


 


11/21/17 08:00 98.2 84 17 130/64 94 Room Air  


 


11/21/17 08:00   20     


 


11/21/17 04:00 99.3 81 17 121/73 97 Room Air  


 


11/21/17 04:00   20     


 


11/21/17 00:19 98.8 70 19 118/61 94 Room Air  


 


11/21/17 00:00   20     


 


11/20/17 22:13 98.9       


 


11/20/17 20:51 101.9 93 21 124/60 96 Room Air  


 


11/20/17 20:42  93  124/60    








Height (Feet):  5


Height (Inches):  9.00


Weight (Pounds):  280


General Appearance:  no acute distress


HEENT:  normocephalic, atraumatic, anicteric, mucous membranes moist


Respiratory/Chest:  lungs clear, normal breath sounds, no respiratory distress, 

no accessory muscle use


Cardiovascular:  normal rate, regular rhythm, no gallop/murmur


Abdomen:  normal bowel sounds, soft, non tender, no organomegaly, non distended


Genitourinary:  other - no jenkins


Extremities:  no cyanosis


Skin:  no rash, other - wounds covered


Neurologic/Psychiatric:  CNs II-XII grossly normal, alert, oriented x 3, 

responsive


Lymphatic:  no neck adenopathy


Musculoskeletal:  no effusion


Objective








chest x-ray - negative for pna





Microbiology








 Date/Time


Source Procedure


Growth Status


 


 


 11/14/17 12:30


Blood Blood Culture - Final


NO GROWTH AFTER 5 DAYS Complete





 11/14/17 11:49


Wound Gram Stain - Final Complete


 


 11/14/17 11:49 Wound Culture - Final


Staphylococcus Aureus


Staphylococcus Sp Coag Neg Complete


 


 11/20/17 16:55


Urine,Clean Catch Urine Culture - Preliminary


Gram Negative Bacillus 1 Resulted








Microbiology








 Date/Time


Source Procedure


Growth Status


 


 


 11/20/17 16:55


Urine,Clean Catch Urine Culture - Preliminary


Gram Negative Bacillus 1 Resulted











Laboratory Tests








Test


  11/21/17


05:20 11/21/17


19:20


 


White Blood Count


  18.0 K/UL


(4.8-10.8)  H 


 


 


Red Blood Count


  4.05 M/UL


(4.20-5.40)  L 


 


 


Hemoglobin


  11.7 G/DL


(12.0-16.0)  L 


 


 


Hematocrit


  35.6 %


(37.0-47.0)  L 


 


 


Mean Corpuscular Volume 88 FL (80-99)   


 


Mean Corpuscular Hemoglobin


  28.8 PG


(27.0-31.0) 


 


 


Mean Corpuscular Hemoglobin


Concent 32.8 G/DL


(32.0-36.0) 


 


 


Red Cell Distribution Width


  13.0 %


(11.6-14.8) 


 


 


Platelet Count


  351 K/UL


(150-450) 


 


 


Mean Platelet Volume


  6.0 FL


(6.5-10.1)  L 


 


 


Neutrophils (%) (Auto)


  % (45.0-75.0)


  


 


 


Lymphocytes (%) (Auto)


  % (20.0-45.0)


  


 


 


Monocytes (%) (Auto)  % (1.0-10.0)   


 


Eosinophils (%) (Auto)  % (0.0-3.0)   


 


Basophils (%) (Auto)  % (0.0-2.0)   


 


Differential Total Cells


Counted 100  


  


 


 


Neutrophils % (Manual) 83 % (45-75)  H 


 


Lymphocytes % (Manual) 14 % (20-45)  L 


 


Monocytes % (Manual) 2 % (1-10)   


 


Eosinophils % (Manual) 1 % (0-3)   


 


Basophils % (Manual) 0 % (0-2)   


 


Band Neutrophils 0 % (0-8)   


 


Platelet Estimate Adequate   


 


Platelet Morphology Normal   


 


Red Blood Cell Morphology Normal   


 


Vancomycin Level Trough


  


  9.1 ug/mL


(5.0-12.0)











Current Medications








 Medications


  (Trade)  Dose


 Ordered  Sig/Hilda


 Route


 PRN Reason  Start Time


 Stop Time Status Last Admin


Dose Admin


 


 Acetaminophen


  (Tylenol)  650 mg  Q4H  PRN


 ORAL


 FEVER  11/17/17 09:00


 12/17/17 08:59  11/20/17 21:14


 


 


 Al Hydroxide/Mg


 Hydroxide


  (Mylanta II)  30 ml  Q6H  PRN


 ORAL


 dyspepsia  11/14/17 13:30


 12/14/17 13:29  11/21/17 13:05


 


 


 Bisacodyl


  (Dulcolax)  10 mg  DAILYPRN  PRN


 RECTAL


 Constipation  11/18/17 13:30


 12/18/17 13:29   


 


 


 Bisacodyl


  (Dulcolax)  10 mg  DAILYPRN  PRN


 RECTAL


 Constipation 2ND LINE AGENT  11/21/17 15:45


 12/21/17 15:44   


 


 


 Cetirizine HCl


  (ZyrTEC)  10 mg  QPM


 ORAL


   11/14/17 16:30


 12/14/17 16:29  11/21/17 16:26


 


 


 Dextrose


  (Dextrose 50%)    STAT  PRN


 IV


 Hypoglycemia  11/14/17 13:30


 12/14/17 13:29   


 


 


 Diphenhydramine


 HCl


  (Benadryl)  12.5 mg  Q6H  PRN


 IVP


 Itching/Pruritis  11/15/17 09:00


 12/15/17 08:59  11/18/17 23:05


 


 


 Diphenhydramine


 HCl


  (Benadryl)  25 mg  Q6H  PRN


 ORAL


 Itching/Pruritis  11/14/17 13:30


 12/14/17 13:29  11/19/17 17:00


 


 


 Docusate Sodium


  (Colace)  100 mg  EVERY 12  HOURS


 ORAL


   11/14/17 21:00


 12/14/17 20:59  11/21/17 08:37


 


 


 Guaifenesin


  (Mucinex ER)  600 mg  TWICE A DAY  PRN


 ORAL


 cough, congestion  11/18/17 13:30


 12/18/17 13:29  11/18/17 17:19


 


 


 Heparin Sodium


  (Porcine)


  (Heparin 5000


 units/ml)  5,000 units  EVERY 12  HOURS


 SUBQ


   11/17/17 21:00


 12/17/17 20:59  11/21/17 08:44


 


 


 Hydralazine HCl


  (Apresoline)  10 mg  Q6H  PRN


 ORAL


 SBP > 160  11/19/17 16:45


 12/19/17 16:44  11/19/17 17:01


 


 


 Hydromorphone HCl  30 ml @ 0


 mls/hr  Q24H  PRN


 IV


 For Pain  11/21/17 15:00


 11/23/17 14:59  11/21/17 16:08


 


 


 Hydromorphone HCl


  (Dilaudid)  2 mg  Q4H  PRN


 IVP


 Moderate Pain (Pain Scale 4-6)  11/21/17 15:00


 11/23/17 14:59   


 


 


 Hydromorphone HCl


  (Dilaudid)  2 mg  q3h  PRN


 SUBQ


 Severe Pain (Pain Scale 7-10)  11/21/17 15:00


 11/23/17 14:59   


 


 


 Metoclopramide HCl


  (Reglan)  10 mg  Q6H  PRN


 IVP


 Nausea & Vomiting  11/17/17 09:00


 12/17/17 08:59  11/20/17 12:50


 


 


 Metoprolol


 Tartrate


  (Lopressor)  50 mg  Q12HR


 ORAL


   11/16/17 21:00


 12/16/17 20:59  11/21/17 08:37


 


 


 Miscellaneous


 Medication


  (PCA Rate Change)  1 ea  DAILYPRN  PRN


 MISC


 rate change  11/21/17 15:00


 11/23/17 14:59   


 


 


 Miscellaneous


 Medication


  (PCA shift


 volume)  1 ea  Q12HR@0700,1900


 MISC


   11/21/17 19:00


 11/23/17 18:59  11/21/17 19:00


 


 


 Naloxone HCl


  (Narcan)  0.1 mg  STAT  PRN


 IV


 RR<10,SBP<90,OR NON-RESPONSIVE  11/19/17 09:00


 12/19/17 08:59   


 


 


 Naproxen


  (Naprosyn)  500 mg  BIDPRN  PRN


 ORAL


 For Headache  11/21/17 11:15


 12/21/17 11:14  11/21/17 11:51


 


 


 Neomycin/


 Polymyxin/


 Bacitracin


  (Neosporin Oint


 15gm)  1 applic  BID


 TOPIC


   11/20/17 11:00


 12/20/17 10:59  11/21/17 18:06


 


 


 Ondansetron HCl


  (Zofran)  4 mg  Q6H  PRN


 IVP


 Nausea & Vomiting  11/17/17 09:00


 12/17/17 08:59  11/20/17 11:55


 


 


 Pantoprazole


  (Protonix)  40 mg  BEFORE  DINNER


 ORAL


   11/14/17 16:30


 12/15/17 08:59  11/21/17 16:25


 


 


 Piperacillin Sod/


 Tazobactam Sod


 3.375 gm/Dextrose  55 ml @ 


 13.75 mls/


 hr  Q8HR


 IVPB


   11/21/17 22:00


 11/28/17 21:59   


 


 


 Polyethylene


 Glycol


  (Miralax)  17 gm  DAILY


 ORAL


   11/19/17 09:00


 12/19/17 08:59  11/21/17 08:37


 


 


 Sennosides


  (Senokot)  17.2 mg  DAILY


 ORAL


   11/21/17 16:00


 12/21/17 15:59  11/21/17 16:26


 


 


 Sodium Chloride  1,000 ml @ 


 75 mls/hr  [id number]


 IV


   11/20/17 15:45


 12/20/17 15:44  11/21/17 05:20


 


 


 Vancomycin HCl


  (Vanco rx to


 dose)  1 ea  DAILY  PRN


 MISC


 Per rx protocol  11/20/17 18:15


 12/20/17 18:14   


 


 


 Vancomycin HCl/


 Dextrose  250 ml @ 


 125 mls/hr  Q12H


 IVPB


   11/20/17 20:00


 11/25/17 19:59  11/21/17 08:37


 

















SHADE JEAN Nov 21, 2017 20:36

## 2017-11-22 VITALS — DIASTOLIC BLOOD PRESSURE: 67 MMHG | SYSTOLIC BLOOD PRESSURE: 126 MMHG

## 2017-11-22 VITALS — DIASTOLIC BLOOD PRESSURE: 79 MMHG | SYSTOLIC BLOOD PRESSURE: 138 MMHG

## 2017-11-22 VITALS — DIASTOLIC BLOOD PRESSURE: 65 MMHG | SYSTOLIC BLOOD PRESSURE: 123 MMHG

## 2017-11-22 VITALS — SYSTOLIC BLOOD PRESSURE: 113 MMHG | DIASTOLIC BLOOD PRESSURE: 58 MMHG

## 2017-11-22 VITALS — DIASTOLIC BLOOD PRESSURE: 75 MMHG | SYSTOLIC BLOOD PRESSURE: 112 MMHG

## 2017-11-22 LAB
ANION GAP SERPL CALC-SCNC: 6 MMOL/L (ref 5–15)
BASOPHILS NFR BLD AUTO: 0.6 % (ref 0–2)
CALCIUM SERPL-MCNC: 8.7 MG/DL (ref 8.5–10.1)
CHLORIDE SERPL-SCNC: 104 MMOL/L (ref 98–107)
CO2 SERPL-SCNC: 30 MMOL/L (ref 21–32)
CREAT SERPL-MCNC: 0.7 MG/DL (ref 0.55–1.3)
EOSINOPHIL NFR BLD AUTO: 6 % (ref 0–3)
ERYTHROCYTE [DISTWIDTH] IN BLOOD BY AUTOMATED COUNT: 13.2 % (ref 11.6–14.8)
GFR SERPLBLD BASED ON 1.73 SQ M-ARVRAT: > 60 ML/MIN (ref 60–?)
LYMPHOCYTES NFR BLD AUTO: 13.2 % (ref 20–45)
MCH RBC QN AUTO: 29.2 PG (ref 27–31)
MCHC RBC AUTO-ENTMCNC: 33.3 G/DL (ref 32–36)
MCV RBC AUTO: 88 FL (ref 80–99)
MONOCYTES NFR BLD AUTO: 6.1 % (ref 1–10)
NEUTROPHILS NFR BLD AUTO: 74.1 % (ref 45–75)
PLATELET # BLD: 382 K/UL (ref 150–450)
PMV BLD AUTO: 5.8 FL (ref 6.5–10.1)
POTASSIUM SERPL-SCNC: 3.9 MMOL/L (ref 3.5–5.1)
RBC # BLD AUTO: 3.82 M/UL (ref 4.2–5.4)
SODIUM SERPL-SCNC: 140 MMOL/L (ref 136–145)
WBC # BLD AUTO: 10.9 K/UL (ref 4.8–10.8)

## 2017-11-22 RX ADMIN — DEXTROSE MONOHYDRATE SCH MLS/HR: 50 INJECTION, SOLUTION INTRAVENOUS at 22:17

## 2017-11-22 RX ADMIN — HYDROCODONE BITARTRATE AND ACETAMINOPHEN PRN EA: 10; 325 TABLET ORAL at 16:12

## 2017-11-22 RX ADMIN — DOCUSATE SODIUM SCH MG: 100 CAPSULE, LIQUID FILLED ORAL at 22:17

## 2017-11-22 RX ADMIN — METOPROLOL TARTRATE SCH MG: 50 TABLET, FILM COATED ORAL at 22:17

## 2017-11-22 RX ADMIN — HEPARIN SODIUM SCH UNITS: 5000 INJECTION INTRAVENOUS; SUBCUTANEOUS at 22:20

## 2017-11-22 RX ADMIN — POLYETHYLENE GLYCOL 3350 SCH GM: 17 POWDER, FOR SOLUTION ORAL at 10:03

## 2017-11-22 RX ADMIN — DEXTROSE MONOHYDRATE SCH MLS/HR: 50 INJECTION, SOLUTION INTRAVENOUS at 00:06

## 2017-11-22 RX ADMIN — SODIUM CHLORIDE SCH MLS/HR: 9 INJECTION, SOLUTION INTRAVENOUS at 13:04

## 2017-11-22 RX ADMIN — Medication SCH MG: at 10:03

## 2017-11-22 RX ADMIN — METOPROLOL TARTRATE SCH MG: 50 TABLET, FILM COATED ORAL at 10:04

## 2017-11-22 RX ADMIN — BACITRACIN ZINC NEOMYCIN SULFATE POLYMYXIN B SULFATE SCH APPLIC: 400; 3.5; 5 OINTMENT TOPICAL at 10:03

## 2017-11-22 RX ADMIN — DEXTROSE MONOHYDRATE SCH MLS/HR: 50 INJECTION, SOLUTION INTRAVENOUS at 06:40

## 2017-11-22 RX ADMIN — DOCUSATE SODIUM SCH MG: 100 CAPSULE, LIQUID FILLED ORAL at 10:03

## 2017-11-22 RX ADMIN — SODIUM CHLORIDE SCH MLS/HR: 9 INJECTION, SOLUTION INTRAVENOUS at 04:02

## 2017-11-22 RX ADMIN — SODIUM CHLORIDE SCH MLS/HR: 9 INJECTION, SOLUTION INTRAVENOUS at 20:32

## 2017-11-22 RX ADMIN — BACITRACIN ZINC NEOMYCIN SULFATE POLYMYXIN B SULFATE SCH APPLIC: 400; 3.5; 5 OINTMENT TOPICAL at 18:00

## 2017-11-22 RX ADMIN — HYDROCODONE BITARTRATE AND ACETAMINOPHEN PRN EA: 10; 325 TABLET ORAL at 11:25

## 2017-11-22 RX ADMIN — HYDROCODONE BITARTRATE AND ACETAMINOPHEN PRN EA: 10; 325 TABLET ORAL at 20:31

## 2017-11-22 RX ADMIN — DEXTROSE MONOHYDRATE SCH MLS/HR: 50 INJECTION, SOLUTION INTRAVENOUS at 14:59

## 2017-11-22 RX ADMIN — HEPARIN SODIUM SCH UNITS: 5000 INJECTION INTRAVENOUS; SUBCUTANEOUS at 10:07

## 2017-11-22 NOTE — GENERAL PROGRESS NOTE
Assessment/Plan


Problem List:  


(1) Sepsis


ICD Codes:  A41.9 - Sepsis, unspecified organism


SNOMED:  06593470


(2) UTI (urinary tract infection)


ICD Codes:  N39.0 - Urinary tract infection, site not specified


SNOMED:  98792589


(3) Abscess


ICD Codes:  L02.91 - Cutaneous abscess, unspecified


SNOMED:  724957039


(4) Hidradenitis suppurativa


ICD Codes:  L73.2 - Hidradenitis suppurativa


SNOMED:  06418131


(5) Paroxysmal atrial tachycardia


ICD Codes:  I47.1 - Supraventricular tachycardia


SNOMED:  151410363


(6) HTN (hypertension)


ICD Codes:  I10 - Essential (primary) hypertension


SNOMED:  92557322


(7) GERD (gastroesophageal reflux disease)


ICD Codes:  K21.9 - Gastro-esophageal reflux disease without esophagitis


SNOMED:  108877243


(8) Acute blood loss anemia


ICD Codes:  D62 - Acute posthemorrhagic anemia


SNOMED:  088294902


Status:  stable


Assessment/Plan


Appreciate plastic surgery rec's


s/p right axillary and lower abdominal wall debridement with flap elevation on 

11/15/17


s/p closure of right axillary and abdominal wounds on 11/17/17


Cont IV abx--expanded to vanco and zosyn per ID


U/A c/w UTI


F/u urine cx-->100K GNR


F/u repeat blood culture


ID consulted, appreciate rec's


Post operative recommendations include:


- encourage mobilization/ambulation


- encourage incentive spirometry to optimize pulmonary hygiene


- DVT/GI prophylaxis as appropriate--SCDs, HSQ


- pain control, supportive care, bowel regimen


Appreciate cardiology rec's


TTE unremarkable


Cont home MTP


Cont home PPI


Mucinex PRN





DVT ppx: SCDs, HSQ as pt is now moderate risk postop





A total of 31m2n of extra time was spent in addition to normal encounter time 

for care/coordination and counseling. D/w pt/family, RN, SW/CM, surgery, 

cardiology regarding mgmt and dispo





Subjective


Date patient seen:  Nov 21, 2017


Time patient seen:  13:00


ROS Limited/Unobtainable:  No


Constitutional:  Reports: fever


HEENT:  Reports: no symptoms


Cardiovascular:  Reports: no symptoms


Respiratory:  Reports: no symptoms


Gastrointestinal/Abdominal:  Reports: no symptoms


Genitourinary:  Reports: burning


Neurologic/Psychiatric:  Reports: no symptoms


Endocrine:  Reports: no symptoms


Hematologic/Lymphatic:  Reports: no symptoms


Allergies:  


Coded Allergies:  


     CELECOXIB (Verified  Allergy, Unknown, 11/14/17)


All Systems:  reviewed and negative except above


Subjective


No acute o/n events


Fever to 101.9 last night, afebrile this AM


s/p right axillary and lower abdominal wall debridement with flap elevation POD#

6


s/p closure of right axillary and abdominal wounds POD#4


WBC rising to 18K


Bueno removed yesterday


Urine cx shows >100K GNR





Pt states feeling better this AM. Pain controlled, denies f/c, d/c, chest pain, 

palpitations. Some dysuria


No BM yet


Ambulating





Objective





Last 24 Hour Vital Signs








  Date Time  Temp Pulse Resp B/P (MAP) Pulse Ox O2 Delivery O2 Flow Rate FiO2


 


11/22/17 04:00   20     


 


11/22/17 04:00 98.8 67 18 126/67 99 Room Air  


 


11/22/17 00:00   20     


 


11/21/17 20:58  75  127/65    


 


11/21/17 20:00 99.5 75 18 127/65 96 Room Air  


 


11/21/17 20:00   20     


 


11/21/17 16:00 97.5 80 17 117/61 97 Room Air  


 


11/21/17 16:00   20     


 


11/21/17 13:00 98.5 84 17 110/63 95 Room Air  


 


11/21/17 12:00   20     


 


11/21/17 08:37  84  130/64    


 


11/21/17 08:00 98.2 84 17 130/64 94 Room Air  


 


11/21/17 08:00   20     








Laboratory Tests


11/21/17 19:20: Vancomycin Level Trough 9.1


Height (Feet):  5


Height (Inches):  9.00


Weight (Pounds):  280


Objective


General: alert, cooperative, no distress, appears stated age


Head: normocephalic, without obvious abnormality, atraumatic


Eyes: conjunctivae/corneas clear. PERRL, EOM's intact


Throat: lips, mucosa, and tongue normal. MMM


Neck: supple, symmetrical, trachea midline, and no JVD


Lungs: clear to auscultation bilaterally


Heart: regular rate and rhythm, S1, S2 normal, no murmur, click, rub or gallop


Abdomen: soft, non-tender, non-distended, bowel sounds normal; no masses or 

organomegaly


Extremities: extremities normal, atraumatic, no cyanosis or edema


Pulses: 2+ and symmetric


Skin: Dressing c/d/i


Neurologic: grossly normal, no focal deficits











Pihl Marie M.D. Nov 22, 2017 07:35

## 2017-11-22 NOTE — INFECTIOUS DISEASES PROG NOTE
Assessment/Plan


Assessment/Plan








A)


   1) pseudomonas uti, fevers, leukocytosis, chest x-ray negative 


      - clinically improved, leukocytosis and fevers resolved 


   2) right axilla and lower abdominal wound infection with abscess/cellulitis


   3) s/p debridement and closure, prior wound culture with mssa and cns


   4) hidradenitis suppurativa, htn, svt/tachycardia hx, fibromyalgia, gerd, 

anemia


   5) allergy - celecoxib, fh-nc, sh-negative, mar noted, notes and records 

noted


   6) d/w RN





P)


   1) change to vancomycin and zosyn - can discharge on ciprofloxacin plus 

clindamycin for 5 days


   2) d/w Dr Andree Villarreal 


   3) continue tx per Dr. Villarreal, Dr. Bustos and consultants


   4) wound care per surgery 


   5) watch labs periodically 


   6) orders entered and noted





Subjective


Constitutional:  Denies: fever


HEENT:  Denies: congestion


Respiratory:  Denies: shortness of breath


Cardiovascular:  Denies: chest pain


Gastrointestinal/Abdominal:  Denies: nausea, vomiting, diarrhea


Genitourinary:  Denies: hematuria, other


Neurologic:  Denies: headache


Psychiatric:  Denies: depression


Skin:  Denies: rash


Hematologic:  Denies: bleeding


Musculoskeletal:  Denies: pain


Allergies:  


Coded Allergies:  


     CELECOXIB (Verified  Allergy, Unknown, 11/14/17)





Objective


Vital Signs





Last 24 Hour Vital Signs








  Date Time  Temp Pulse Resp B/P (MAP) Pulse Ox O2 Delivery O2 Flow Rate FiO2


 


11/22/17 12:00 97.5 74 18 112/75 96 Room Air  


 


11/22/17 10:04  75  123/65    


 


11/22/17 08:00 97.5 75 16 123/65 98 Room Air  


 


11/22/17 08:00   19     


 


11/22/17 04:00   20     


 


11/22/17 04:00 98.8 67 18 126/67 99 Room Air  


 


11/22/17 00:00   20     


 


11/21/17 20:58  75  127/65    


 


11/21/17 20:00 99.5 75 18 127/65 96 Room Air  


 


11/21/17 20:00   20     


 


11/21/17 16:00 97.5 80 17 117/61 97 Room Air  


 


11/21/17 16:00   20     








Height (Feet):  5


Height (Inches):  9.00


Weight (Pounds):  280


General Appearance:  no acute distress


HEENT:  normocephalic, atraumatic, anicteric, mucous membranes moist, EOMI, 

pharynx normal, supple, no JVD


Respiratory/Chest:  lungs clear, normal breath sounds, no respiratory distress, 

no accessory muscle use


Cardiovascular:  normal rate, regular rhythm, no gallop/murmur, no JVD


Abdomen:  normal bowel sounds, soft, non tender, no organomegaly, non distended


Genitourinary:  other - no jenkins


Extremities:  no cyanosis


Skin:  no rash, other - wounds covered


Neurologic/Psychiatric:  CNs II-XII grossly normal, alert, responsive


Lymphatic:  no neck adenopathy


Musculoskeletal:  no effusion


Objective








chest x-ray - negative for pna





Microbiology








 Date/Time


Source Procedure


Growth Status


 


 


 11/20/17 15:50


Blood Blood Culture - Preliminary


NO GROWTH AFTER 24 HOURS Resulted


 


 11/20/17 15:45


Blood Blood Culture - Preliminary


NO GROWTH AFTER 24 HOURS Resulted


 


 11/20/17 16:55


Urine,Clean Catch Urine Culture - Final


Pseudomonas Aeruginosa Complete











Laboratory Tests








Test


  11/21/17


19:20 11/22/17


07:05


 


Vancomycin Level Trough


  9.1 ug/mL


(5.0-12.0) 


 


 


White Blood Count


  


  10.9 K/UL


(4.8-10.8)  H


 


Red Blood Count


  


  3.82 M/UL


(4.20-5.40)  L


 


Hemoglobin


  


  11.2 G/DL


(12.0-16.0)  L


 


Hematocrit


  


  33.5 %


(37.0-47.0)  L


 


Mean Corpuscular Volume  88 FL (80-99)  


 


Mean Corpuscular Hemoglobin


  


  29.2 PG


(27.0-31.0)


 


Mean Corpuscular Hemoglobin


Concent 


  33.3 G/DL


(32.0-36.0)


 


Red Cell Distribution Width


  


  13.2 %


(11.6-14.8)


 


Platelet Count


  


  382 K/UL


(150-450)


 


Mean Platelet Volume


  


  5.8 FL


(6.5-10.1)  L


 


Neutrophils (%) (Auto)


  


  74.1 %


(45.0-75.0)


 


Lymphocytes (%) (Auto)


  


  13.2 %


(20.0-45.0)  L


 


Monocytes (%) (Auto)


  


  6.1 %


(1.0-10.0)


 


Eosinophils (%) (Auto)


  


  6.0 %


(0.0-3.0)  H


 


Basophils (%) (Auto)


  


  0.6 %


(0.0-2.0)


 


Sodium Level


  


  140 MMOL/L


(136-145)


 


Potassium Level


  


  3.9 MMOL/L


(3.5-5.1)


 


Chloride Level


  


  104 MMOL/L


()


 


Carbon Dioxide Level


  


  30 MMOL/L


(21-32)


 


Anion Gap


  


  6 mmol/L


(5-15)


 


Blood Urea Nitrogen


  


  11 mg/dL


(7-18)


 


Creatinine


  


  0.7 MG/DL


(0.55-1.30)


 


Estimat Glomerular Filtration


Rate 


  > 60 mL/min


(>60)


 


Glucose Level


  


  108 MG/DL


()  H


 


Calcium Level


  


  8.7 MG/DL


(8.5-10.1)











Current Medications








 Medications


  (Trade)  Dose


 Ordered  Sig/Hilda


 Route


 PRN Reason  Start Time


 Stop Time Status Last Admin


Dose Admin


 


 Acetaminophen


  (Tylenol)  650 mg  Q4H  PRN


 ORAL


 FEVER  11/17/17 09:00


 12/17/17 08:59  11/20/17 21:14


 


 


 Acetaminophen/


 Hydrocodone Bitart


  (Norco 10/325)  1 ea  Q4H  PRN


 ORAL


 PAIN 4-10  11/22/17 10:00


 11/29/17 09:59  11/22/17 11:25


 


 


 Al Hydroxide/Mg


 Hydroxide


  (Mylanta II)  30 ml  Q6H  PRN


 ORAL


 dyspepsia  11/14/17 13:30


 12/14/17 13:29  11/21/17 13:05


 


 


 Bisacodyl


  (Dulcolax)  10 mg  DAILYPRN  PRN


 RECTAL


 Constipation  11/18/17 13:30


 12/18/17 13:29   


 


 


 Bisacodyl


  (Dulcolax)  10 mg  DAILYPRN  PRN


 RECTAL


 Constipation 2ND LINE AGENT  11/21/17 15:45


 12/21/17 15:44   


 


 


 Cetirizine HCl


  (ZyrTEC)  10 mg  QPM


 ORAL


   11/14/17 16:30


 12/14/17 16:29  11/21/17 16:26


 


 


 Dextrose


  (Dextrose 50%)    STAT  PRN


 IV


 Hypoglycemia  11/14/17 13:30


 12/14/17 13:29   


 


 


 Diphenhydramine


 HCl


  (Benadryl)  25 mg  Q6H  PRN


 ORAL


 Itching/Pruritis  11/14/17 13:30


 12/14/17 13:29  11/19/17 17:00


 


 


 Docusate Sodium


  (Colace)  100 mg  EVERY 12  HOURS


 ORAL


   11/14/17 21:00


 12/14/17 20:59  11/22/17 10:03


 


 


 Guaifenesin


  (Mucinex ER)  600 mg  TWICE A DAY  PRN


 ORAL


 cough, congestion  11/18/17 13:30


 12/18/17 13:29  11/18/17 17:19


 


 


 Heparin Sodium


  (Porcine)


  (Heparin 5000


 units/ml)  5,000 units  EVERY 12  HOURS


 SUBQ


   11/17/17 21:00


 12/17/17 20:59  11/22/17 10:07


 


 


 Hydralazine HCl


  (Apresoline)  10 mg  Q6H  PRN


 ORAL


 SBP > 160  11/19/17 16:45


 12/19/17 16:44  11/19/17 17:01


 


 


 Metoclopramide HCl


  (Reglan)  10 mg  Q6H  PRN


 IVP


 Nausea & Vomiting  11/17/17 09:00


 12/17/17 08:59  11/20/17 12:50


 


 


 Metoprolol


 Tartrate


  (Lopressor)  50 mg  Q12HR


 ORAL


   11/16/17 21:00


 12/16/17 20:59  11/22/17 10:04


 


 


 Naproxen


  (Naprosyn)  500 mg  BIDPRN  PRN


 ORAL


 For Headache  11/21/17 11:15


 12/21/17 11:14  11/21/17 11:51


 


 


 Neomycin/


 Polymyxin/


 Bacitracin


  (Neosporin Oint


 15gm)  1 applic  BID


 TOPIC


   11/20/17 11:00


 12/20/17 10:59  11/22/17 10:03


 


 


 Ondansetron HCl


  (Zofran)  4 mg  Q6H  PRN


 IVP


 Nausea & Vomiting  11/17/17 09:00


 12/17/17 08:59  11/22/17 06:33


 


 


 Pantoprazole


  (Protonix)  40 mg  BEFORE  DINNER


 ORAL


   11/14/17 16:30


 12/15/17 08:59  11/21/17 16:25


 


 


 Piperacillin Sod/


 Tazobactam Sod


 3.375 gm/Dextrose  55 ml @ 


 13.75 mls/


 hr  Q8HR


 IVPB


   11/21/17 22:00


 11/28/17 21:59  11/22/17 14:59


 


 


 Polyethylene


 Glycol


  (Miralax)  17 gm  DAILY


 ORAL


   11/19/17 09:00


 12/19/17 08:59  11/22/17 10:03


 


 


 Sennosides


  (Senokot)  17.2 mg  DAILY


 ORAL


   11/21/17 16:00


 12/21/17 15:59  11/22/17 10:03


 


 


 Sodium Chloride  1,000 ml @ 


 75 mls/hr  [id number]


 IV


   11/20/17 15:45


 12/20/17 15:44  11/21/17 05:20


 


 


 Vancomycin HCl


  (Vanco rx to


 dose)  1 ea  DAILY  PRN


 MISC


 Per rx protocol  11/20/17 18:15


 12/20/17 18:14   


 


 


 Vancomycin HCl 1


 gm/Dextrose  275 ml @ 


 183.708


 mls/hr  Q8H


 IVPB


   11/22/17 04:00


 11/27/17 03:59  11/22/17 13:04


 

















SHADE JEAN Nov 22, 2017 15:44

## 2017-11-22 NOTE — GENERAL PROGRESS NOTE
Assessment/Plan


Problem List:  


(1) Sepsis


ICD Codes:  A41.9 - Sepsis, unspecified organism


SNOMED:  55256810


(2) UTI (urinary tract infection)


Assessment & Plan:  2/2 pseudomonas


ICD Codes:  N39.0 - Urinary tract infection, site not specified


SNOMED:  25603681


(3) Abscess


ICD Codes:  L02.91 - Cutaneous abscess, unspecified


SNOMED:  886439681


(4) Hidradenitis suppurativa


ICD Codes:  L73.2 - Hidradenitis suppurativa


SNOMED:  11146037


(5) Paroxysmal atrial tachycardia


ICD Codes:  I47.1 - Supraventricular tachycardia


SNOMED:  603374736


(6) HTN (hypertension)


ICD Codes:  I10 - Essential (primary) hypertension


SNOMED:  95146551


(7) GERD (gastroesophageal reflux disease)


ICD Codes:  K21.9 - Gastro-esophageal reflux disease without esophagitis


SNOMED:  419783277


(8) Acute blood loss anemia


ICD Codes:  D62 - Acute posthemorrhagic anemia


SNOMED:  449542460


Status:  stable


Assessment/Plan


Appreciate plastic surgery rec's


s/p right axillary and lower abdominal wall debridement with flap elevation on 

11/15/17


s/p closure of right axillary and abdominal wounds on 11/17/17


Cont IV abx--expanded to vanco and zosyn per ID


U/A c/w UTI


F/u urine cx-->100K pseudomonas


F/u repeat blood culture--ngtd


ID consulted, appreciate rec's


Post operative recommendations include:


- encourage mobilization/ambulation


- encourage incentive spirometry to optimize pulmonary hygiene


- DVT/GI prophylaxis as appropriate--SCDs, HSQ


- pain control, supportive care, bowel regimen


Appreciate cardiology rec's


TTE unremarkable


Cont home MTP


Cont home PPI


Mucinex PRN


Home health ordered for wound care


Possible d/c tomororw





DVT ppx: SCDs, HSQ as pt is now moderate risk postop





A total of 31m2n of extra time was spent in addition to normal encounter time 

for care/coordination and counseling. D/w pt/family, RN, SW/CM, surgery, 

cardiology regarding mgmt and dispo





Subjective


Date patient seen:  Nov 22, 2017


Time patient seen:  14:10


ROS Limited/Unobtainable:  No


Constitutional:  Reports: no symptoms


HEENT:  Reports: no symptoms


Cardiovascular:  Reports: no symptoms


Respiratory:  Reports: no symptoms


Gastrointestinal/Abdominal:  Reports: no symptoms


Genitourinary:  Reports: no symptoms


Neurologic/Psychiatric:  Reports: no symptoms


Endocrine:  Reports: no symptoms


Hematologic/Lymphatic:  Reports: no symptoms


Allergies:  


Coded Allergies:  


     CELECOXIB (Verified  Allergy, Unknown, 11/14/17)


All Systems:  reviewed and negative except above


Subjective


No acute o/n events


Afebrile o/n


s/p right axillary and lower abdominal wall debridement with flap elevation POD#

7


s/p closure of right axillary and abdominal wounds POD#5


WBC improved to 10.89K


Urine cx shows >100K pseudomonas





Pt states feeling better this AM. Pain controlled, denies f/c, d/c, chest pain, 

palpitations. 


+BM yesterday


Ambulating





Objective





Last 24 Hour Vital Signs








  Date Time  Temp Pulse Resp B/P (MAP) Pulse Ox O2 Delivery O2 Flow Rate FiO2


 


11/22/17 12:00 97.5 74 18 112/75 96 Room Air  


 


11/22/17 10:04  75  123/65    


 


11/22/17 08:00 97.5 75 16 123/65 98 Room Air  


 


11/22/17 08:00   19     


 


11/22/17 04:00   20     


 


11/22/17 04:00 98.8 67 18 126/67 99 Room Air  


 


11/22/17 00:00   20     


 


11/21/17 20:58  75  127/65    


 


11/21/17 20:00 99.5 75 18 127/65 96 Room Air  


 


11/21/17 20:00   20     


 


11/21/17 16:00 97.5 80 17 117/61 97 Room Air  


 


11/21/17 16:00   20     

















Intake and Output  


 


 11/22/17 11/23/17





 19:00 07:00


 


Intake Total 250 ml 


 


Balance 250 ml 


 


  


 


Intake Oral 250 ml 


 


# Voids 1 








Laboratory Tests


11/21/17 19:20: Vancomycin Level Trough 9.1


11/22/17 07:05: 


White Blood Count 10.9H, Red Blood Count 3.82L, Hemoglobin 11.2L, Hematocrit 

33.5L, Mean Corpuscular Volume 88, Mean Corpuscular Hemoglobin 29.2, Mean 

Corpuscular Hemoglobin Concent 33.3, Red Cell Distribution Width 13.2, Platelet 

Count 382, Mean Platelet Volume 5.8L, Neutrophils (%) (Auto) 74.1, Lymphocytes (

%) (Auto) 13.2L, Monocytes (%) (Auto) 6.1, Eosinophils (%) (Auto) 6.0H, 

Basophils (%) (Auto) 0.6, Sodium Level 140, Potassium Level 3.9, Chloride Level 

104, Carbon Dioxide Level 30, Anion Gap 6, Blood Urea Nitrogen 11, Creatinine 

0.7, Estimat Glomerular Filtration Rate > 60, Glucose Level 108H, Calcium Level 

8.7


Height (Feet):  5


Height (Inches):  9.00


Weight (Pounds):  280


Objective


General: alert, cooperative, no distress, appears stated age


Head: normocephalic, without obvious abnormality, atraumatic


Eyes: conjunctivae/corneas clear. PERRL, EOM's intact


Throat: lips, mucosa, and tongue normal. MMM


Neck: supple, symmetrical, trachea midline, and no JVD


Lungs: clear to auscultation bilaterally


Heart: regular rate and rhythm, S1, S2 normal, no murmur, click, rub or gallop


Abdomen: soft, non-tender, non-distended, bowel sounds normal; no masses or 

organomegaly


Extremities: extremities normal, atraumatic, no cyanosis or edema


Pulses: 2+ and symmetric


Skin: Dressing c/d/i


Neurologic: grossly normal, no focal deficits











Phil Marie M.D. Nov 22, 2017 14:10

## 2017-11-22 NOTE — CARDIOLOGY PROGRESS NOTE
Assessment/Plan


Assessment/Plan


psvt 


sepsis 


gerd 


htn 





seems to be doign well 


goign home soon 


bb to be continued 


bp is normal 


not febrile


labs noted look good 


orthosttic vitals to be checked





Subjective


Cardiovascular:  Reports: lightheadedness, Denies: chest pain, irregular heart 

rate, palpitations


Respiratory:  Denies: shortness of breath


Gastrointestinal/Abdominal:  Denies: abdominal pain


Genitourinary:  Denies: burning





Objective





Last 24 Hour Vital Signs








  Date Time  Temp Pulse Resp B/P (MAP) Pulse Ox O2 Delivery O2 Flow Rate FiO2


 


11/22/17 16:00 99.2 69 17 113/58 97 Room Air  


 


11/22/17 12:00 97.5 74 18 112/75 96 Room Air  


 


11/22/17 10:04  75  123/65    


 


11/22/17 08:00 97.5 75 16 123/65 98 Room Air  


 


11/22/17 08:00   19     


 


11/22/17 04:00   20     


 


11/22/17 04:00 98.8 67 18 126/67 99 Room Air  


 


11/22/17 00:00   20     


 


11/21/17 20:58  75  127/65    


 


11/21/17 20:00 99.5 75 18 127/65 96 Room Air  


 


11/21/17 20:00   20     








General Appearance:  no apparent distress, alert, obese


Neck:  supple


Cardiovascular:  normal rate, regular rhythm


Respiratory/Chest:  lungs clear, normal breath sounds


Abdomen:  normal bowel sounds, non tender, soft


Extremities:  no swelling - pneumoatic stocking in palce











Intake and Output  


 


 11/22/17 11/23/17





 19:00 07:00


 


Intake Total 250 ml 


 


Balance 250 ml 


 


  


 


Intake Oral 250 ml 


 


# Voids 1 











Laboratory Tests








Test


  11/21/17


19:20 11/22/17


07:05


 


Vancomycin Level Trough


  9.1 ug/mL


(5.0-12.0) 


 


 


White Blood Count


  


  10.9 K/UL


(4.8-10.8)  H


 


Red Blood Count


  


  3.82 M/UL


(4.20-5.40)  L


 


Hemoglobin


  


  11.2 G/DL


(12.0-16.0)  L


 


Hematocrit


  


  33.5 %


(37.0-47.0)  L


 


Mean Corpuscular Volume  88 FL (80-99)  


 


Mean Corpuscular Hemoglobin


  


  29.2 PG


(27.0-31.0)


 


Mean Corpuscular Hemoglobin


Concent 


  33.3 G/DL


(32.0-36.0)


 


Red Cell Distribution Width


  


  13.2 %


(11.6-14.8)


 


Platelet Count


  


  382 K/UL


(150-450)


 


Mean Platelet Volume


  


  5.8 FL


(6.5-10.1)  L


 


Neutrophils (%) (Auto)


  


  74.1 %


(45.0-75.0)


 


Lymphocytes (%) (Auto)


  


  13.2 %


(20.0-45.0)  L


 


Monocytes (%) (Auto)


  


  6.1 %


(1.0-10.0)


 


Eosinophils (%) (Auto)


  


  6.0 %


(0.0-3.0)  H


 


Basophils (%) (Auto)


  


  0.6 %


(0.0-2.0)


 


Sodium Level


  


  140 MMOL/L


(136-145)


 


Potassium Level


  


  3.9 MMOL/L


(3.5-5.1)


 


Chloride Level


  


  104 MMOL/L


()


 


Carbon Dioxide Level


  


  30 MMOL/L


(21-32)


 


Anion Gap


  


  6 mmol/L


(5-15)


 


Blood Urea Nitrogen


  


  11 mg/dL


(7-18)


 


Creatinine


  


  0.7 MG/DL


(0.55-1.30)


 


Estimat Glomerular Filtration


Rate 


  > 60 mL/min


(>60)


 


Glucose Level


  


  108 MG/DL


()  H


 


Calcium Level


  


  8.7 MG/DL


(8.5-10.1)











Microbiology








 Date/Time


Source Procedure


Growth Status


 


 


 11/20/17 15:50


Blood Blood Culture - Preliminary


NO GROWTH AFTER 24 HOURS Resulted


 


 11/20/17 15:45


Blood Blood Culture - Preliminary


NO GROWTH AFTER 24 HOURS Resulted


 


 11/20/17 16:55


Urine,Clean Catch Urine Culture - Final


Pseudomonas Aeruginosa Complete

















SHERMAN THOMAS Nov 22, 2017 18:57

## 2017-11-23 VITALS — SYSTOLIC BLOOD PRESSURE: 128 MMHG | DIASTOLIC BLOOD PRESSURE: 58 MMHG

## 2017-11-23 VITALS — DIASTOLIC BLOOD PRESSURE: 59 MMHG | SYSTOLIC BLOOD PRESSURE: 114 MMHG

## 2017-11-23 VITALS — SYSTOLIC BLOOD PRESSURE: 136 MMHG | DIASTOLIC BLOOD PRESSURE: 63 MMHG

## 2017-11-23 VITALS — SYSTOLIC BLOOD PRESSURE: 124 MMHG | DIASTOLIC BLOOD PRESSURE: 69 MMHG

## 2017-11-23 RX ADMIN — POLYETHYLENE GLYCOL 3350 SCH GM: 17 POWDER, FOR SOLUTION ORAL at 09:41

## 2017-11-23 RX ADMIN — BACITRACIN ZINC NEOMYCIN SULFATE POLYMYXIN B SULFATE SCH APPLIC: 400; 3.5; 5 OINTMENT TOPICAL at 09:41

## 2017-11-23 RX ADMIN — HYDROCODONE BITARTRATE AND ACETAMINOPHEN PRN EA: 10; 325 TABLET ORAL at 11:25

## 2017-11-23 RX ADMIN — ALUMINUM HYDROXIDE, MAGNESIUM HYDROXIDE, AND SIMETHICONE PRN ML: 200; 200; 20 SUSPENSION ORAL at 00:34

## 2017-11-23 RX ADMIN — DOCUSATE SODIUM SCH MG: 100 CAPSULE, LIQUID FILLED ORAL at 09:40

## 2017-11-23 RX ADMIN — Medication SCH MG: at 09:41

## 2017-11-23 RX ADMIN — METOPROLOL TARTRATE SCH MG: 50 TABLET, FILM COATED ORAL at 09:41

## 2017-11-23 RX ADMIN — HYDROCODONE BITARTRATE AND ACETAMINOPHEN PRN EA: 10; 325 TABLET ORAL at 00:34

## 2017-11-23 RX ADMIN — HYDROCODONE BITARTRATE AND ACETAMINOPHEN PRN EA: 10; 325 TABLET ORAL at 05:14

## 2017-11-23 RX ADMIN — SODIUM CHLORIDE SCH MLS/HR: 9 INJECTION, SOLUTION INTRAVENOUS at 12:00

## 2017-11-23 RX ADMIN — HEPARIN SODIUM SCH UNITS: 5000 INJECTION INTRAVENOUS; SUBCUTANEOUS at 09:46

## 2017-11-23 RX ADMIN — SODIUM CHLORIDE SCH MLS/HR: 9 INJECTION, SOLUTION INTRAVENOUS at 03:54

## 2017-11-23 RX ADMIN — DEXTROSE MONOHYDRATE SCH MLS/HR: 50 INJECTION, SOLUTION INTRAVENOUS at 06:17

## 2017-11-23 NOTE — DISCHARGE SUMMARY
Discharge Summary


Hospital Course


Date of Admission


Nov 14, 2017 at 12:23


Date of Discharge


Nov 23, 2017 at 13:41


Admitting Diagnosis


hidradenitis


Reason for Hospitalization:  abscess


HPI


43y/o female with pmh of hidradenitis suppurative, paroxysmal atrial tachycardia

, HTN, GERD who presents with worsening b/l axillary lesions and lower 

abdominal lesions. Pt notes worsening pain/swelling/redness/drainage from b/l 

axillary and lower abd. She has had this for many years. She had prior surgery 

on R axilla abt 1 yr and 10mo ago w/o much success. She has tried antibiotics 

without much effect. She denies f/c, n/v, d/c, chest pain, SOB, abd pain, 

dysuria. She has had general anesthesia before without complications. Able to 

walk a few blocks or climb a flight of stairs w/o symptoms of chest pain, SOB. 

She follows up with a cardiology regarding her Atach and it controlled on 

metoprolol. She has undergone cardiac eval including ECHO which has been 

unremarkable.


Consultations


Plastic surgery


Procedures


Right axillary and lower abdominal wall debridement with flap elevation on 11/15

/17


Closure of right axillary and abdominal wounds on 11/17/17


Hospital Course


Pt was admitted and continued on IV antibiotics. Pt underwent right axillary 

and lower abdominal wall debridement with flap elevation on 11/15/17, followed 

by closure of right axillary and abdominal wounds on 11/17/17. Pt tolerated 

procedure well. Hospital course complicate by fevers and leukocytosis. Pt's 

antibiotics expanded. Pt found to be in sepsis secondary to UTI (Bueno catheter 

related) with urine culture showing Pseudomonas. Once wounds stable, sepsis 

improved and pain controlled, pt was discharged home with oral pain medications 

and oral antibiotics.





Discharge Medications


New Medications:  


Ciprofloxacin Hcl* (Ciprofloxacin Hcl*) 500 Mg Tablet


500 MG ORAL Q12H for 7 Days, #14 TAB 0 Refills





Clindamycin Hcl (Clindamycin Hcl) 300 Mg Capsule


300 MG ORAL QID for 7 Days, #28 CAP





Ondansetron Odt* (Zofran Odt*) 4 Mg Tab.rapdis


4 MG ORAL Q6H PRN, #30 TAB 0 Refills





 


Continued Medications:  


Ascorbic Acid* (Vitamin C*) 500 Mg Tablet


500 MG ORAL, #30 TAB 0 Refills





Cetirizine Hcl* (Zyrtec*) 10 Mg Tablet


10 MG ORAL DAILY, #30 TAB 0 Refills





Guaifenesin/Pseudoephedrne Hcl (Mucinex D Er Tablet) 1 Each Tab.er.12h


1 EACH PO, TAB





Metoprolol Tartrate (Metoprolol Tartrate) 25 Mg Tablet


25 MG ORAL DAILY, TAB





Metoprolol Tartrate* (Metoprolol Tartrate*) 50 Mg Tablet


50 MG ORAL QPM, TAB





Multivitamin/Iron/Folic Acid (Centrum Complete Multivit Tab) 1 Each Tablet


1 EACH PO, TAB





Naproxen Sodium (Aleve) 220 Mg Capsule


220 MG PO, CAP





Omeprazole Magnesium (Prilosec Otc) 20 Mg Tablet.dr


20 MG ORAL unknown , TAB





Turmeric Root Extract (Turmeric) 500 Mg Capsule


500 MG PO TID, CAP











Discharge


Condition Upon Discharge:  stable


Discharge Disposition


Patient was discharged to Home with home health


Discharge Diagnoses:  


(1) Abscess


(2) Hidradenitis suppurativa


(3) Paroxysmal atrial tachycardia


(4) HTN (hypertension)


(5) GERD (gastroesophageal reflux disease)


(6) UTI (urinary tract infection)


(7) Sepsis


(8) Acute blood loss anemia











Phil Marie M.D. Nov 23, 2017 18:31

## 2017-11-27 NOTE — DIAGNOSTIC IMAGING REPORT
--------------- APPROVED REPORT --------------





CPT Code: 32166



Present Symptoms

Comments: Post Op 

R/O DVT





BILATERAL: Imaging reveals a patent deep venous system bilaterally. There is no evidence 

of thrombus within the femoral, popliteal or tibial segments. The greater saphenous veins 

are also within normal limits. Doppler indicates normal spontaneous flow within these 

segments.

## 2017-11-29 NOTE — OPERATIVE NOTE - PDOC
Operative Note


Operative Note


Date of Operation/Procedure:  Nov 15, 2017


Procedure:


Abdominal tissue debridment and flap elevation with left axillary tissue 

excision and flap elevation


Post-op Diagnosis:  same as pre-op


Surgeon:  Nkechi


Assistant:  Juli


Specimen:  yes


Complications:  none


Condition:  stable


Estimated Blood Loss:  minimal


Drains:  none


Implant(s) used?:  No











LIZ VALLES Nov 29, 2017 11:30